# Patient Record
Sex: FEMALE | Race: WHITE | NOT HISPANIC OR LATINO | Employment: UNEMPLOYED | URBAN - NONMETROPOLITAN AREA
[De-identification: names, ages, dates, MRNs, and addresses within clinical notes are randomized per-mention and may not be internally consistent; named-entity substitution may affect disease eponyms.]

---

## 2017-05-01 ENCOUNTER — HOSPITAL ENCOUNTER (OUTPATIENT)
Dept: GENERAL RADIOLOGY | Facility: HOSPITAL | Age: 52
Discharge: HOME | End: 2017-05-01
Attending: INTERNAL MEDICINE | Admitting: INTERNAL MEDICINE

## 2017-05-01 DIAGNOSIS — R06.02 SOB (SHORTNESS OF BREATH): Primary | ICD-10-CM

## 2017-05-01 DIAGNOSIS — R06.02 SOB (SHORTNESS OF BREATH): ICD-10-CM

## 2017-05-01 PROCEDURE — 71020 XR CHEST PA AND LATERAL: CPT | Performed by: RADIOLOGY

## 2017-05-01 PROCEDURE — 71020 HC CHEST PA AND LATERAL: CPT

## 2017-05-02 ENCOUNTER — OFFICE VISIT (OUTPATIENT)
Dept: PULMONOLOGY | Facility: CLINIC | Age: 52
End: 2017-05-02

## 2017-05-02 VITALS
BODY MASS INDEX: 28.04 KG/M2 | WEIGHT: 185 LBS | HEART RATE: 68 BPM | TEMPERATURE: 97.8 F | OXYGEN SATURATION: 98 % | HEIGHT: 68 IN | SYSTOLIC BLOOD PRESSURE: 142 MMHG | DIASTOLIC BLOOD PRESSURE: 92 MMHG

## 2017-05-02 DIAGNOSIS — J45.20 MILD INTERMITTENT ASTHMA WITHOUT COMPLICATION: Primary | ICD-10-CM

## 2017-05-02 LAB
FEV1: 1.8 LITERS
FVC VOL RESPIRATORY: 2.04 LITERS

## 2017-05-02 PROCEDURE — 94010 BREATHING CAPACITY TEST: CPT | Performed by: INTERNAL MEDICINE

## 2017-05-02 PROCEDURE — 99213 OFFICE O/P EST LOW 20 MIN: CPT | Performed by: INTERNAL MEDICINE

## 2017-05-02 ASSESSMENT — PULMONARY FUNCTION TESTS
FEV1: 1.80
FVC: 2.04

## 2018-01-23 ENCOUNTER — OFFICE VISIT (OUTPATIENT)
Dept: PULMONOLOGY | Facility: CLINIC | Age: 53
End: 2018-01-23

## 2018-01-23 VITALS
HEART RATE: 65 BPM | HEIGHT: 68 IN | WEIGHT: 189.2 LBS | SYSTOLIC BLOOD PRESSURE: 142 MMHG | TEMPERATURE: 97.6 F | BODY MASS INDEX: 28.67 KG/M2 | OXYGEN SATURATION: 98 % | DIASTOLIC BLOOD PRESSURE: 87 MMHG

## 2018-01-23 DIAGNOSIS — J45.20 MILD INTERMITTENT ASTHMA WITHOUT COMPLICATION: Primary | ICD-10-CM

## 2018-01-23 DIAGNOSIS — Z23 IMMUNIZATION DUE: ICD-10-CM

## 2018-01-23 LAB
FEV1: 1.88 LITERS
FVC VOL RESPIRATORY: 2.06 LITERS

## 2018-01-23 PROCEDURE — 90471 IMMUNIZATION ADMIN: CPT | Performed by: INTERNAL MEDICINE

## 2018-01-23 PROCEDURE — 90686 IIV4 VACC NO PRSV 0.5 ML IM: CPT | Performed by: INTERNAL MEDICINE

## 2018-01-23 PROCEDURE — 99213 OFFICE O/P EST LOW 20 MIN: CPT | Performed by: INTERNAL MEDICINE

## 2018-01-23 PROCEDURE — 94010 BREATHING CAPACITY TEST: CPT | Performed by: INTERNAL MEDICINE

## 2018-01-23 ASSESSMENT — PULMONARY FUNCTION TESTS
FEV1: 1.88
FVC: 2.06

## 2018-04-10 ENCOUNTER — HOSPITAL ENCOUNTER (OUTPATIENT)
Dept: CARDIOLOGY | Facility: HOSPITAL | Age: 53
Discharge: HOME | End: 2018-04-10
Attending: NURSE PRACTITIONER | Admitting: NURSE PRACTITIONER

## 2018-04-10 ENCOUNTER — OFFICE VISIT (OUTPATIENT)
Dept: CARDIOLOGY | Facility: HOSPITAL | Age: 53
End: 2018-04-10

## 2018-04-10 VITALS
RESPIRATION RATE: 16 BRPM | HEART RATE: 90 BPM | WEIGHT: 191.2 LBS | TEMPERATURE: 97.6 F | SYSTOLIC BLOOD PRESSURE: 143 MMHG | HEIGHT: 68 IN | OXYGEN SATURATION: 100 % | DIASTOLIC BLOOD PRESSURE: 85 MMHG | BODY MASS INDEX: 28.98 KG/M2

## 2018-04-10 DIAGNOSIS — R06.00 DYSPNEA, UNSPECIFIED TYPE: ICD-10-CM

## 2018-04-10 DIAGNOSIS — I10 ESSENTIAL HYPERTENSION: Primary | ICD-10-CM

## 2018-04-10 DIAGNOSIS — R00.2 PALPITATIONS: ICD-10-CM

## 2018-04-10 PROCEDURE — 93005 ELECTROCARDIOGRAM TRACING: CPT | Performed by: NURSE PRACTITIONER

## 2018-04-10 PROCEDURE — 99204 OFFICE O/P NEW MOD 45 MIN: CPT | Performed by: NURSE PRACTITIONER

## 2018-04-10 PROCEDURE — 93010 ELECTROCARDIOGRAM REPORT: CPT | Performed by: INTERNAL MEDICINE

## 2018-04-10 PROCEDURE — 93270 REMOTE 30 DAY ECG REV/REPORT: CPT

## 2018-04-10 RX ORDER — HYDRALAZINE HYDROCHLORIDE 10 MG/1
10 TABLET, FILM COATED ORAL 3 TIMES DAILY
COMMUNITY
End: 2018-04-10

## 2018-04-10 RX ORDER — AMLODIPINE BESYLATE 5 MG/1
5 TABLET ORAL DAILY
COMMUNITY
End: 2018-05-22 | Stop reason: SINTOL

## 2018-04-10 RX ORDER — LOSARTAN POTASSIUM 50 MG/1
50 TABLET ORAL 2 TIMES DAILY
COMMUNITY
End: 2019-09-26

## 2018-04-10 RX ORDER — CLONIDINE HYDROCHLORIDE 0.1 MG/1
0.1 TABLET ORAL 2 TIMES DAILY PRN
COMMUNITY
End: 2019-09-26

## 2018-04-10 NOTE — PROGRESS NOTES
"Encounter Date:04/10/2018      Patient ID: Darlene Gaviria is a 53 y.o. female.        Subjective:     Chief Complaint: Establish Care (HTN)     History of Present Illness  Ms. Gaviria is a 54 YO F with history of asthma, mediastinal lymphoma, and HTN who [facility] of HTN. She reports HTN for over a year. She is currently taking metoprolol 50mg BID and losartan 50mg BID and clonidine as needed. She brings in BP/HR log and BPs run in the 130s-150s/80-100s. HRs are . She reports a history of palpitations and feelings of her heart \"pounding\" on a weekly basis with some improvement on metoprolol. No chest pain. She does have dyspnea during episodes of palpitations. She also has headache and muscle heaviness when BP uncontrolled. She was started on hydralazine and amlodipine by different providers but hasn't started taking yet. She reports clonidine causes her fatigue and she really does not want to take this. She had a normal echo in  and normal stress test in 2018 ordered by cardiologist Dr. Fox. She has never wore a cardiac monitor. She is concerned because she says she lives a healthy lifestyle and doesn't understand why she has to be on so many medications for her blood pressure    Patient Active Problem List   Diagnosis   • Mild intermittent asthma without complication   • Essential hypertension         Past Surgical History:   Procedure Laterality Date   • APPENDECTOMY     • BREAST BIOPSY     •  SECTION     • GALLBLADDER SURGERY     • HYSTERECTOMY     • OTHER SURGICAL HISTORY      Diaphragm and left vocal cord paralysed.   • THORACIC DISC SURGERY         No Known Allergies      Current Outpatient Prescriptions:   •  CloNIDine (CATAPRES) 0.1 MG tablet, Take 0.1 mg by mouth 2 (Two) Times a Day As Needed for High Blood Pressure (SBP > 160)., Disp: , Rfl:   •  losartan (COZAAR) 50 MG tablet, Take 50 mg by mouth 2 (Two) Times a Day., Disp: , " Rfl:   •  albuterol (PROVENTIL) (2.5 MG/3ML) 0.083% nebulizer solution, inhale contents of 1 vial in nebulizer every 4 to 6 hours as directed if needed, Disp: , Rfl: 0  •  amLODIPine (NORVASC) 5 MG tablet, Take 5 mg by mouth Daily., Disp: , Rfl:   •  metoprolol tartrate (LOPRESSOR) 50 MG tablet, Take 50 mg by mouth Every 12 (Twelve) Hours., Disp: , Rfl:     The following portions of the chart were reviewed and updated as appropriate: Allergies, current medications, past family history, social history, past medical history.     Review of Systems   Constitution: Positive for weakness and malaise/fatigue. Negative for chills, decreased appetite, diaphoresis, fever, night sweats, weight gain and weight loss.   HENT: Negative for congestion, hearing loss, hoarse voice and nosebleeds.    Eyes: Positive for blurred vision. Negative for visual disturbance and visual halos.   Cardiovascular: Positive for chest pain, claudication, dyspnea on exertion, irregular heartbeat, leg swelling and palpitations. Negative for cyanosis, near-syncope, orthopnea, paroxysmal nocturnal dyspnea and syncope.   Respiratory: Positive for shortness of breath. Negative for cough, hemoptysis, sleep disturbances due to breathing, snoring, sputum production and wheezing.    Hematologic/Lymphatic: Negative for bleeding problem. Does not bruise/bleed easily.   Skin: Negative for dry skin, itching and rash.   Musculoskeletal: Positive for muscle weakness. Negative for arthritis, joint pain, joint swelling and myalgias.   Gastrointestinal: Positive for nausea. Negative for bloating, abdominal pain, constipation, diarrhea, flatus, heartburn, hematemesis, hematochezia, melena and vomiting.   Genitourinary: Positive for frequency. Negative for dysuria, hematuria, nocturia and urgency.   Neurological: Positive for dizziness, headaches, light-headedness and loss of balance. Negative for excessive daytime sleepiness.   Psychiatric/Behavioral: Negative for  "depression. The patient is nervous/anxious. The patient does not have insomnia.            Objective:     Vitals:    04/10/18 1402 04/10/18 1404   BP: 146/82 143/85   BP Location: Right arm Right arm   Patient Position: Sitting Standing   Pulse: 85 90   Resp: 16    Temp: 97.6 °F (36.4 °C)    TempSrc: Temporal Artery     SpO2: 100%    Weight: 86.7 kg (191 lb 3.2 oz)    Height: 172.7 cm (68\")          Physical Exam   Constitutional: She is oriented to person, place, and time. She appears well-developed and well-nourished. No distress.   HENT:   Head: Normocephalic.   Eyes: Conjunctivae are normal. Pupils are equal, round, and reactive to light.   Neck: Neck supple. No JVD present. No thyromegaly present.   Cardiovascular: Normal rate, regular rhythm, normal heart sounds and intact distal pulses.  Exam reveals no gallop and no friction rub.    No murmur heard.  Pulmonary/Chest: Effort normal and breath sounds normal. No respiratory distress. She has no wheezes. She has no rales. She exhibits no tenderness.   Abdominal: Soft. Bowel sounds are normal.   Musculoskeletal: Normal range of motion. She exhibits no edema.   Neurological: She is alert and oriented to person, place, and time.   Skin: Skin is warm and dry.   Psychiatric: She has a normal mood and affect. Her behavior is normal. Thought content normal.   Vitals reviewed.      Lab and Diagnostic Review:    EKG today shows NSR, LVH with repolarization abnormality    Cardiolite stress test on February 7, 2018 showed moderate impairment of exercise capacity, EKG that showed no ischemia, no stress-induced arrhythmias, average heart rate response, hypertensive blood pressure response, normal LV systolic function and wall motion, no evidence of perfusion defect or stress induced LV cavity dilatation    Echo 8/10/2016 showed normal LV size and wall thickness normal LV systolic function and wall motion estimated EF 60-65% normal cardiac chamber dimensions with no " significant valvular abnormalities.    Labs 3/20/2018 show glucose 97, BUN 16, creatinine 0.9, sodium 142, potassium 4.1, chloride 103, carbon dioxide 24    Assessment and Plan:         1. Essential hypertension  - Start amlodipine 5mg in am (BPs higher in PM). Continue all other meds. Hopefully with this she will not need the clonidine  - HTN Education provided today including signs and symptoms, medication management, daily blood pressure monitoring. Recommended DASH diet. Patient encouraged to call the [facility] with any blood pressure consistently greater than 130/80  - Duplex Renal Artery - Bilateral Complete CAR; Future    2. Palpitations  - Continue metoprolol. She does not want to increase at this time due to side effects  - Cardiac Event Monitor; Future    3. Dyspnea, unspecified type  - Normal stress in 2018 and echo in 2016  - ECG 12 Lead; Future    RV in 6 weeks    It has been a pleasure to participate in the care of this patient.  Patient was instructed to call the [facility] with any questions, concerns, or worsening symptoms.      * Please note that portions of this note were completed with a voice recognition program. Efforts were made to edit the dictation but occasionally words are transcribed.

## 2018-04-11 ENCOUNTER — HOSPITAL ENCOUNTER (OUTPATIENT)
Dept: CARDIOLOGY | Facility: HOSPITAL | Age: 53
Discharge: HOME | End: 2018-04-11
Attending: NURSE PRACTITIONER

## 2018-04-11 DIAGNOSIS — R00.2 PALPITATIONS: ICD-10-CM

## 2018-04-11 PROCEDURE — 93270 REMOTE 30 DAY ECG REV/REPORT: CPT

## 2018-05-22 ENCOUNTER — OFFICE VISIT (OUTPATIENT)
Dept: CARDIOLOGY | Facility: HOSPITAL | Age: 53
End: 2018-05-22

## 2018-05-22 VITALS
HEIGHT: 68 IN | RESPIRATION RATE: 17 BRPM | OXYGEN SATURATION: 98 % | TEMPERATURE: 97.2 F | HEART RATE: 94 BPM | WEIGHT: 190 LBS | BODY MASS INDEX: 28.79 KG/M2 | DIASTOLIC BLOOD PRESSURE: 99 MMHG | SYSTOLIC BLOOD PRESSURE: 129 MMHG

## 2018-05-22 DIAGNOSIS — R00.2 PALPITATIONS: Primary | ICD-10-CM

## 2018-05-22 DIAGNOSIS — I10 ESSENTIAL HYPERTENSION: ICD-10-CM

## 2018-05-22 PROCEDURE — 93272 ECG/REVIEW INTERPRET ONLY: CPT | Performed by: INTERNAL MEDICINE

## 2018-05-22 PROCEDURE — 99213 OFFICE O/P EST LOW 20 MIN: CPT | Performed by: NURSE PRACTITIONER

## 2018-05-22 NOTE — PROGRESS NOTES
[facility]      Encounter Date:2018     Darlene Gaviria  [address]  [place] [address]  [phone]    1965    Dima Duarte MD    Darlene Gaviria is a 53 y.o. female.      Subjective:     Chief Complaint:  Follow up palpitations       HPI   [facility] to follow up on palpitations. Patient wore event monitor, which showed no significant arrhythmias. One triggered event was sinus tachycardia with a rate of 109. She only wore the event monitor for a couple of weeks because of skin sensitivity. Overall she is feeling much better. Palpitations have improved. She started on amlodipine and BP was well controlled without clonidine. However, after 2 weeks she had an episode of dizziness and says BP was 70/40. She stopped amlodipine and since then BP has been in the 130-140s systolic. Patient did not have renal duplex because it could only be done in [place]    Patient Active Problem List   Diagnosis   • Mild intermittent asthma without complication   • Essential hypertension       Past Medical History:   Diagnosis Date   • Hypertension    • Non Hodgkin's lymphoma     surgery to remove cancer, part of left lung @ [place], [place]       Past Surgical History:   Procedure Laterality Date   • APPENDECTOMY     • BREAST BIOPSY     •  SECTION     • GALLBLADDER SURGERY     • HYSTERECTOMY     • OTHER SURGICAL HISTORY      Diaphragm and left vocal cord paralysed.   • THORACIC DISC SURGERY         Family History   Problem Relation Age of Onset   • Hypertension Mother    • Hypothyroidism Mother    • Hypertension Father    • Diabetes Father    • Heart disease Father    • Hypothyroidism Father    • Heart disease Maternal Grandmother    • Hypertension Maternal Grandmother    • Heart disease Maternal Grandfather    • Diabetes Maternal Grandfather    • Hypertension Maternal Grandfather    • Heart disease Paternal Grandmother    •  Hypertension Paternal Grandmother    • Heart disease Paternal Grandfather    • Hypertension Paternal Grandfather        Social History     Social History   • Marital status:      Spouse name: N/A   • Number of children: N/A   • Years of education: N/A     Occupational History   • Disabled      Social History Main Topics   • Smoking status: Never Smoker   • Smokeless tobacco: Never Used   • Alcohol use No   • Drug use: No   • Sexual activity: Not on file     Other Topics Concern   • Not on file     Social History Narrative    Caffeine:3 serving per day.           No Known Allergies      Current Outpatient Prescriptions:   •  albuterol (PROVENTIL) (2.5 MG/3ML) 0.083% nebulizer solution, inhale contents of 1 vial in nebulizer every 4 to 6 hours as directed if needed, Disp: , Rfl: 0  •  CloNIDine (CATAPRES) 0.1 MG tablet, Take 0.1 mg by mouth 2 (Two) Times a Day As Needed for High Blood Pressure (SBP > 160)., Disp: , Rfl:   •  losartan (COZAAR) 50 MG tablet, Take 50 mg by mouth 2 (Two) Times a Day., Disp: , Rfl:   •  metoprolol tartrate (LOPRESSOR) 50 MG tablet, Take 50 mg by mouth Every 12 (Twelve) Hours., Disp: , Rfl:     The following portions of the patient's history were reviewed and updated as appropriate: allergies, current medications, past family history, past medical history, past social history, past surgical history and problem list.    Review of Systems   Constitution: Positive for weakness and malaise/fatigue. Negative for chills, decreased appetite, diaphoresis, fever, night sweats, weight gain and weight loss.   HENT: Negative for congestion, hearing loss, hoarse voice and nosebleeds.    Eyes: Positive for blurred vision. Negative for visual disturbance and visual halos.   Cardiovascular: Positive for dyspnea on exertion and palpitations. Negative for chest pain, claudication, cyanosis, irregular heartbeat, leg swelling, near-syncope, orthopnea, paroxysmal nocturnal dyspnea and syncope.  "  Respiratory: Positive for cough, sputum production and wheezing. Negative for hemoptysis, shortness of breath, sleep disturbances due to breathing and snoring.    Hematologic/Lymphatic: Negative for bleeding problem. Does not bruise/bleed easily.   Skin: Negative for dry skin, itching and rash.   Musculoskeletal: Negative for arthritis, joint pain, joint swelling, muscle weakness and myalgias.   Gastrointestinal: Negative for bloating, abdominal pain, constipation, diarrhea, flatus, heartburn, hematemesis, hematochezia, melena, nausea and vomiting.   Genitourinary: Negative for dysuria, frequency, hematuria, nocturia and urgency.   Neurological: Negative for excessive daytime sleepiness, dizziness, headaches, light-headedness and loss of balance.   Psychiatric/Behavioral: Negative for depression. The patient does not have insomnia and is not nervous/anxious.        Objective:     Vitals:    05/22/18 1529 05/22/18 1531   BP: 141/88 129/99   BP Location: Right arm Right arm   Patient Position: Sitting Standing   Cuff Size: Adult    Pulse: 74 94   Resp: 17    Temp: 97.2 °F (36.2 °C)    TempSrc: Temporal Artery     SpO2: 98%    Weight: 86.2 kg (190 lb)    Height: 172.7 cm (68\")        Physical Exam   Constitutional: She is oriented to person, place, and time. She appears well-developed and well-nourished. No distress.   HENT:   Head: Normocephalic.   Eyes: Conjunctivae are normal. Pupils are equal, round, and reactive to light.   Neck: Neck supple. No JVD present. No thyromegaly present.   Cardiovascular: Normal rate, regular rhythm, normal heart sounds and intact distal pulses.  Exam reveals no gallop and no friction rub.    No murmur heard.  Pulmonary/Chest: Effort normal and breath sounds normal. No respiratory distress. She has no wheezes. She has no rales. She exhibits no tenderness.   Abdominal: Soft. Bowel sounds are normal.   Musculoskeletal: Normal range of motion. She exhibits no edema.   Neurological: She " is alert and oriented to person, place, and time.   Skin: Skin is warm and dry.   Psychiatric: She has a normal mood and affect. Her behavior is normal. Thought content normal.   Vitals reviewed.      Lab and Diagnostic Review:  Reviewed results of event monitor with patient.  Monitor time was 9 days in 2 hours.  Underlying rhythm was normal sinus rhythm with a average heart rate of 83, minimum heart rate 55, maximum heart rate of 149.  Triggered event was sinus tachycardia with a heart rate of 109.  There was infrequent ectopy      Assessment and Plan:   1. Palpitations  - Stable. Symptoms improved. No significant arrhythmias on monitor    2. Essential hypertension  - Controlled.  - HTN Education provided today including signs and symptoms, medication management, daily blood pressure monitoring. Recommended DASH diet. Patient encouraged to call the [facility] with any blood pressure consistently greater than 130/80. I advised her to resume amlodipine at 2.5mg daily if SBP consistently >130. No need for renal duplex at this time    FU PRN        It has been a pleasure to participate in the care of this patient.  Patient was instructed to call the [facility] with any questions, concerns, or worsening symptoms.    *Please note that portions of this note were completed with a voice recognition program. Efforts were made to edit the dictations, but occasionally words are mistranscribed.

## 2018-07-23 DIAGNOSIS — R06.02 SOB (SHORTNESS OF BREATH): Primary | ICD-10-CM

## 2018-07-24 ENCOUNTER — OFFICE VISIT (OUTPATIENT)
Dept: PULMONOLOGY | Facility: CLINIC | Age: 53
End: 2018-07-24

## 2018-07-24 VITALS
TEMPERATURE: 97.7 F | WEIGHT: 189.4 LBS | HEART RATE: 67 BPM | SYSTOLIC BLOOD PRESSURE: 146 MMHG | BODY MASS INDEX: 28.7 KG/M2 | OXYGEN SATURATION: 97 % | HEIGHT: 68 IN | DIASTOLIC BLOOD PRESSURE: 102 MMHG

## 2018-07-24 DIAGNOSIS — J45.20 MILD INTERMITTENT ASTHMA WITHOUT COMPLICATION: Primary | ICD-10-CM

## 2018-07-24 DIAGNOSIS — C85.92 NON-HODGKIN LYMPHOMA OF INTRATHORACIC LYMPH NODES, UNSPECIFIED NON-HODGKIN LYMPHOMA TYPE (HCC): ICD-10-CM

## 2018-07-24 LAB
FEV1: 1.76 LITERS
FVC VOL RESPIRATORY: 1.95 LITERS

## 2018-07-24 PROCEDURE — 94010 BREATHING CAPACITY TEST: CPT | Performed by: INTERNAL MEDICINE

## 2018-07-24 PROCEDURE — 99213 OFFICE O/P EST LOW 20 MIN: CPT | Performed by: INTERNAL MEDICINE

## 2018-07-24 ASSESSMENT — PULMONARY FUNCTION TESTS
FVC: 1.95
FEV1: 1.76

## 2018-07-24 NOTE — PROGRESS NOTES
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 Pulido of oncology      Review of Systems intermittent cough occasional shortness of breath and wheezing over seems to be under control with singular once a day Advair twice a day and rarely needs to use the Inhaler for rescue and takes losartan and metoprolol for blood pressure    Active Problems:  Problem List Items Addressed This Visit        Respiratory    Mild intermittent asthma without complication - Primary    Relevant Orders    Pulmonary Function Test (Completed)      Other Visit Diagnoses     Non-Hodgkin lymphoma of intrathoracic lymph nodes, unspecified non-Hodgkin lymphoma type (CMS/HCC)              Past Medical History:  Past Medical History:   Diagnosis Date   • Hypertension    • Non Hodgkin's lymphoma (CMS/HCC) 2008    surgery to remove cancer, part of left lung @ [place], [place]       Family History:  Family History   Problem Relation Age of Onset   • Hypertension Mother    • Hypothyroidism Mother    • Hypertension Father    • Diabetes Father    • Heart disease Father    • Hypothyroidism Father    • Heart disease Maternal Grandmother    • Hypertension Maternal Grandmother    • Heart disease Maternal Grandfather    • Diabetes Maternal Grandfather    • Hypertension Maternal Grandfather    • Heart disease Paternal Grandmother    • Hypertension Paternal Grandmother    • Heart disease Paternal Grandfather    • Hypertension Paternal Grandfather        Social History:  Social History   Substance Use Topics   • Smoking status: Never Smoker   • Smokeless tobacco: Never Used   • Alcohol use No       Current  "Medications:  Current Outpatient Prescriptions   Medication Sig Dispense Refill   • albuterol (PROVENTIL) (2.5 MG/3ML) 0.083% nebulizer solution inhale contents of 1 vial in nebulizer every 4 to 6 hours as directed if needed  0   • CloNIDine (CATAPRES) 0.1 MG tablet Take 0.1 mg by mouth 2 (Two) Times a Day As Needed for High Blood Pressure (SBP > 160).     • losartan (COZAAR) 50 MG tablet Take 50 mg by mouth 2 (Two) Times a Day.     • metoprolol tartrate (LOPRESSOR) 50 MG tablet Take 50 mg by mouth Every 12 (Twelve) Hours.       No current facility-administered medications for this visit.        Allergies:  No Known Allergies    Vitals:  BP (!) 146/102   Pulse 67   Temp 97.7 °F (36.5 °C) (Oral)   Ht 172.7 cm (68\")   Wt 85.9 kg (189 lb 6.4 oz)   SpO2 97%   BMI 28.80 kg/m²     Physical Exam no acute distress vital signs stable O2 sat 97% on room air blood pressure 146/102 scar of old mediastinotomy minimal expiratory Wheezes heart regular no clubbing cyanosis or cervical lymphadenopathy    Imaging: Last chest x-ray was May 2017 that showed Elevated right hemidiaphragm with no indication of mediastinal Fullness    PFT: FVC 48 FEV1 55% indicating Restrictive impairment due to mediastinal Exploration and to previous lymphoma  Results for orders placed or performed in visit on 07/24/18   Pulmonary Function Test   Result Value Ref Range    FEV1 1.76 liters    FVC 1.95 liters           ASSESSMENT AND DIAGNOSIS:1-asthma seemingly mild and controlled-2-history of non-Hodgkin lymphoma intrathoracic That has been treated with 6 cycles of chemotherapy 2008 and being Followed by Oncology3-hypertension not well-controlled being followed by Dr. Duarte primary care physician  Recommendation continue Advair singular and albuterol inhaler  Reminded her of getting Annual flu vaccine and pneumonia vaccine per protocol    Follow-Up:Return in 6 month with a chest x-ray and PFT or earlier as needed                    "

## 2019-03-04 DIAGNOSIS — R06.02 SOB (SHORTNESS OF BREATH): Primary | ICD-10-CM

## 2019-03-26 DIAGNOSIS — R06.02 SOB (SHORTNESS OF BREATH): Primary | ICD-10-CM

## 2019-08-20 ENCOUNTER — HOSPITAL ENCOUNTER (OUTPATIENT)
Dept: GENERAL RADIOLOGY | Facility: HOSPITAL | Age: 54
Discharge: HOME | End: 2019-08-20
Admitting: INTERNAL MEDICINE

## 2019-08-20 PROCEDURE — 71046 X-RAY EXAM CHEST 2 VIEWS: CPT | Performed by: RADIOLOGY

## 2019-08-20 PROCEDURE — 71046 X-RAY EXAM CHEST 2 VIEWS: CPT

## 2019-08-23 ENCOUNTER — OFFICE VISIT (OUTPATIENT)
Dept: PULMONOLOGY | Facility: CLINIC | Age: 54
End: 2019-08-23

## 2019-08-23 VITALS
SYSTOLIC BLOOD PRESSURE: 153 MMHG | BODY MASS INDEX: 30.16 KG/M2 | HEIGHT: 68 IN | HEART RATE: 75 BPM | WEIGHT: 199 LBS | DIASTOLIC BLOOD PRESSURE: 104 MMHG | OXYGEN SATURATION: 95 % | TEMPERATURE: 98 F

## 2019-08-23 DIAGNOSIS — C85.92 NON-HODGKIN LYMPHOMA OF INTRATHORACIC LYMPH NODES, UNSPECIFIED NON-HODGKIN LYMPHOMA TYPE (HCC): ICD-10-CM

## 2019-08-23 DIAGNOSIS — J45.20 MILD INTERMITTENT ASTHMA WITHOUT COMPLICATION: Primary | ICD-10-CM

## 2019-08-23 PROCEDURE — 99212 OFFICE O/P EST SF 10 MIN: CPT | Performed by: INTERNAL MEDICINE

## 2019-08-23 NOTE — PROGRESS NOTES
Subjective   Chief Complaint   Patient presents with   • Asthma       Darlene Gaviria is a 54 y.o. female     History of Present Illness 54-year-old female returning for follow-up of asthma that is mild and controlled using Symbicort 1 puff a day and rarely needs to use the Ventolin rescue inhaler past history is significant for non-Hodgkin's lymphoma of the mediastinum that was discovered with mediastinoscopy May 2008 treated by oncology is currently in remission and discharge from oncology follow-up    Review of Systems minimal shortness of breath and rare wheezing can otherwise review of systems noncontributory    Family History   Problem Relation Age of Onset   • Hypertension Mother    • Hypothyroidism Mother    • Hypertension Father    • Diabetes Father    • Heart disease Father    • Hypothyroidism Father    • Heart disease Maternal Grandmother    • Hypertension Maternal Grandmother    • Heart disease Maternal Grandfather    • Diabetes Maternal Grandfather    • Hypertension Maternal Grandfather    • Heart disease Paternal Grandmother    • Hypertension Paternal Grandmother    • Heart disease Paternal Grandfather    • Hypertension Paternal Grandfather        Past Medical History:   Diagnosis Date   • Hypertension    • Non Hodgkin's lymphoma (CMS/HCC)     surgery to remove cancer, part of left lung @ [place], [place]       Past Surgical History:   Procedure Laterality Date   • APPENDECTOMY     • BREAST BIOPSY     •  SECTION     • GALLBLADDER SURGERY     • HYSTERECTOMY     • OTHER SURGICAL HISTORY      Diaphragm and left vocal cord paralysed.   • THORACIC DISC SURGERY         Social History     Socioeconomic History   • Marital status:      Spouse name: Not on file   • Number of children: Not on file   • Years of education: Not on file   • Highest education level: Not on file   Occupational History   • Occupation: Disabled   Tobacco Use   • Smoking status: Never Smoker   • Smokeless tobacco: Never  Used   Substance and Sexual Activity   • Alcohol use: No   • Drug use: No   Social History Narrative    Caffeine:3 serving per day.        Physical Exam: No acute distress no cervical lymphadenopathy scar of median sternotomy that is well-healed lungs clear heart regular    PFT: No PFT today usually is borderline normal    Imaging: Chest x-ray August 28 shows score of old man has an ostomy and elevated left hemidiaphragm that is unchanged since July 2008    Other Labs:       ASSESSMENT1-mild and stable-2-non-Hodgkin lymphoma mediastinum treated and currently in remission        Recommendations: Continue Symbicort and Ventolin inhaler reminded her of getting annual flu vaccine    Follow up: Return February 19 with a chest x-ray and PFT and that may be my final visit as I and my practice in my practice in [place] by the end of February

## 2019-09-10 PROBLEM — J38.00 PARALYSIS OF LARYNX: Status: ACTIVE | Noted: 2019-09-10

## 2019-09-10 PROBLEM — R13.14 DYSPHAGIA, PHARYNGOESOPHAGEAL PHASE: Status: ACTIVE | Noted: 2019-09-10

## 2019-09-10 PROBLEM — J37.0 CHRONIC LARYNGITIS: Status: ACTIVE | Noted: 2019-09-10

## 2019-09-24 ENCOUNTER — APPOINTMENT (OUTPATIENT)
Dept: PREADMISSION TESTING | Facility: HOSPITAL | Age: 54
End: 2019-09-24

## 2019-09-26 ENCOUNTER — APPOINTMENT (OUTPATIENT)
Dept: PREADMISSION TESTING | Facility: HOSPITAL | Age: 54
End: 2019-09-26

## 2019-09-26 LAB
ANION GAP SERPL CALCULATED.3IONS-SCNC: 10.2 MMOL/L (ref 5–15)
BUN BLD-MCNC: 11 MG/DL (ref 6–20)
BUN/CREAT SERPL: 13.6 (ref 7–25)
CALCIUM SPEC-SCNC: 9.8 MG/DL (ref 8.6–10.5)
CHLORIDE SERPL-SCNC: 104 MMOL/L (ref 98–107)
CO2 SERPL-SCNC: 27.8 MMOL/L (ref 22–29)
CREAT BLD-MCNC: 0.81 MG/DL (ref 0.57–1)
DEPRECATED RDW RBC AUTO: 43.5 FL (ref 37–54)
ERYTHROCYTE [DISTWIDTH] IN BLOOD BY AUTOMATED COUNT: 13.3 % (ref 12.3–15.4)
GFR SERPL CREATININE-BSD FRML MDRD: 74 ML/MIN/1.73
GLUCOSE BLD-MCNC: 81 MG/DL (ref 65–99)
HCT VFR BLD AUTO: 41.7 % (ref 34–46.6)
HGB BLD-MCNC: 13.5 G/DL (ref 12–15.9)
MCH RBC QN AUTO: 29.5 PG (ref 26.6–33)
MCHC RBC AUTO-ENTMCNC: 32.4 G/DL (ref 31.5–35.7)
MCV RBC AUTO: 91 FL (ref 79–97)
PLATELET # BLD AUTO: 206 10*3/MM3 (ref 140–450)
PMV BLD AUTO: 11.4 FL (ref 6–12)
POTASSIUM BLD-SCNC: 3.9 MMOL/L (ref 3.5–5.2)
RBC # BLD AUTO: 4.58 10*6/MM3 (ref 3.77–5.28)
SODIUM BLD-SCNC: 142 MMOL/L (ref 136–145)
WBC NRBC COR # BLD: 5.34 10*3/MM3 (ref 3.4–10.8)

## 2019-09-26 PROCEDURE — 80048 BASIC METABOLIC PNL TOTAL CA: CPT | Performed by: OTOLARYNGOLOGY

## 2019-09-26 PROCEDURE — 93005 ELECTROCARDIOGRAM TRACING: CPT

## 2019-09-26 PROCEDURE — 93010 ELECTROCARDIOGRAM REPORT: CPT | Performed by: INTERNAL MEDICINE

## 2019-09-26 PROCEDURE — 85027 COMPLETE CBC AUTOMATED: CPT | Performed by: OTOLARYNGOLOGY

## 2019-09-26 PROCEDURE — 36415 COLL VENOUS BLD VENIPUNCTURE: CPT

## 2019-09-26 RX ORDER — VALSARTAN 80 MG/1
80 TABLET ORAL NIGHTLY
COMMUNITY
End: 2019-10-03 | Stop reason: HOSPADM

## 2019-09-26 RX ORDER — AMLODIPINE BESYLATE 5 MG/1
5 TABLET ORAL DAILY
COMMUNITY

## 2019-09-26 RX ORDER — CARVEDILOL 6.25 MG/1
6.25 TABLET ORAL 2 TIMES DAILY WITH MEALS
COMMUNITY

## 2019-09-26 RX ORDER — LORATADINE 10 MG/1
10 TABLET ORAL DAILY
COMMUNITY

## 2019-09-26 RX ORDER — RANITIDINE 300 MG/1
300 TABLET ORAL NIGHTLY
COMMUNITY
End: 2022-03-17

## 2019-09-26 RX ORDER — MONTELUKAST SODIUM 10 MG/1
10 TABLET ORAL NIGHTLY
COMMUNITY
End: 2021-02-01

## 2019-10-01 ENCOUNTER — ANESTHESIA EVENT (OUTPATIENT)
Dept: PERIOP | Facility: HOSPITAL | Age: 54
End: 2019-10-01

## 2019-10-02 ENCOUNTER — ANESTHESIA (OUTPATIENT)
Dept: PERIOP | Facility: HOSPITAL | Age: 54
End: 2019-10-02

## 2019-10-02 ENCOUNTER — HOSPITAL ENCOUNTER (INPATIENT)
Facility: HOSPITAL | Age: 54
LOS: 1 days | Discharge: HOME | End: 2019-10-03
Attending: OTOLARYNGOLOGY | Admitting: OTOLARYNGOLOGY

## 2019-10-02 PROBLEM — Z98.890 STATUS POST SURGERY: Status: ACTIVE | Noted: 2019-10-02

## 2019-10-02 PROCEDURE — 25010000002 MORPHINE PER 10 MG: Performed by: OTOLARYNGOLOGY

## 2019-10-02 PROCEDURE — 25010000002 HYDROMORPHONE 1 MG/ML SOLUTION: Performed by: ANESTHESIOLOGY

## 2019-10-02 PROCEDURE — 94799 UNLISTED PULMONARY SVC/PX: CPT

## 2019-10-02 PROCEDURE — 25010000002 FENTANYL CITRATE (PF) 100 MCG/2ML SOLUTION: Performed by: NURSE ANESTHETIST, CERTIFIED REGISTERED

## 2019-10-02 PROCEDURE — 25010000002 PROPOFOL 10 MG/ML EMULSION: Performed by: NURSE ANESTHETIST, CERTIFIED REGISTERED

## 2019-10-02 PROCEDURE — 25010000002 EPINEPHRINE PER 0.1 MG: Performed by: OTOLARYNGOLOGY

## 2019-10-02 PROCEDURE — 99223 1ST HOSP IP/OBS HIGH 75: CPT | Performed by: PHYSICIAN ASSISTANT

## 2019-10-02 PROCEDURE — 0CUV0JZ SUPPLEMENT LEFT VOCAL CORD WITH SYNTHETIC SUBSTITUTE, OPEN APPROACH: ICD-10-PCS | Performed by: OTOLARYNGOLOGY

## 2019-10-02 PROCEDURE — 25010000002 MIDAZOLAM PER 1 MG: Performed by: NURSE ANESTHETIST, CERTIFIED REGISTERED

## 2019-10-02 PROCEDURE — 25010000003 CEFAZOLIN SODIUM-DEXTROSE 2-3 GM-%(50ML) RECONSTITUTED SOLUTION: Performed by: NURSE ANESTHETIST, CERTIFIED REGISTERED

## 2019-10-02 PROCEDURE — 0BJ08ZZ INSPECTION OF TRACHEOBRONCHIAL TREE, VIA NATURAL OR ARTIFICIAL OPENING ENDOSCOPIC: ICD-10-PCS | Performed by: OTOLARYNGOLOGY

## 2019-10-02 PROCEDURE — 25010000002 DEXAMETHASONE PER 1 MG: Performed by: NURSE ANESTHETIST, CERTIFIED REGISTERED

## 2019-10-02 DEVICE — SIZE: FEMALE 11
Type: IMPLANTABLE DEVICE | Site: THYROID | Status: FUNCTIONAL
Brand: MONTGOMERY® THYROPLASTY IMPLANT

## 2019-10-02 RX ORDER — ONDANSETRON 2 MG/ML
4 INJECTION INTRAMUSCULAR; INTRAVENOUS AS NEEDED
Status: DISCONTINUED | OUTPATIENT
Start: 2019-10-02 | End: 2019-10-02 | Stop reason: HOSPADM

## 2019-10-02 RX ORDER — PROPOFOL 10 MG/ML
VIAL (ML) INTRAVENOUS AS NEEDED
Status: DISCONTINUED | OUTPATIENT
Start: 2019-10-02 | End: 2019-10-02 | Stop reason: SURG

## 2019-10-02 RX ORDER — SODIUM CHLORIDE 0.9 % (FLUSH) 0.9 %
3-10 SYRINGE (ML) INJECTION AS NEEDED
Status: DISCONTINUED | OUTPATIENT
Start: 2019-10-02 | End: 2019-10-02 | Stop reason: HOSPADM

## 2019-10-02 RX ORDER — OXYCODONE HYDROCHLORIDE AND ACETAMINOPHEN 5; 325 MG/1; MG/1
1 TABLET ORAL ONCE AS NEEDED
Status: DISCONTINUED | OUTPATIENT
Start: 2019-10-02 | End: 2019-10-02 | Stop reason: HOSPADM

## 2019-10-02 RX ORDER — MIDAZOLAM HYDROCHLORIDE 1 MG/ML
INJECTION INTRAMUSCULAR; INTRAVENOUS AS NEEDED
Status: DISCONTINUED | OUTPATIENT
Start: 2019-10-02 | End: 2019-10-02 | Stop reason: SURG

## 2019-10-02 RX ORDER — MONTELUKAST SODIUM 10 MG/1
10 TABLET ORAL NIGHTLY
Status: CANCELLED | OUTPATIENT
Start: 2019-10-02

## 2019-10-02 RX ORDER — NALOXONE HCL 0.4 MG/ML
0.4 VIAL (ML) INJECTION
Status: DISCONTINUED | OUTPATIENT
Start: 2019-10-02 | End: 2019-10-03 | Stop reason: HOSPADM

## 2019-10-02 RX ORDER — EPINEPHRINE 1 MG/ML
INJECTION, SOLUTION, CONCENTRATE INTRAVENOUS AS NEEDED
Status: DISCONTINUED | OUTPATIENT
Start: 2019-10-02 | End: 2019-10-02 | Stop reason: HOSPADM

## 2019-10-02 RX ORDER — DEXAMETHASONE SODIUM PHOSPHATE 4 MG/ML
INJECTION, SOLUTION INTRA-ARTICULAR; INTRALESIONAL; INTRAMUSCULAR; INTRAVENOUS; SOFT TISSUE AS NEEDED
Status: DISCONTINUED | OUTPATIENT
Start: 2019-10-02 | End: 2019-10-02 | Stop reason: SURG

## 2019-10-02 RX ORDER — CEFAZOLIN SODIUM 2 G/50ML
SOLUTION INTRAVENOUS AS NEEDED
Status: DISCONTINUED | OUTPATIENT
Start: 2019-10-02 | End: 2019-10-02 | Stop reason: SURG

## 2019-10-02 RX ORDER — SODIUM CHLORIDE, SODIUM LACTATE, POTASSIUM CHLORIDE, CALCIUM CHLORIDE 600; 310; 30; 20 MG/100ML; MG/100ML; MG/100ML; MG/100ML
100 INJECTION, SOLUTION INTRAVENOUS CONTINUOUS
Status: DISCONTINUED | OUTPATIENT
Start: 2019-10-02 | End: 2019-10-03 | Stop reason: HOSPADM

## 2019-10-02 RX ORDER — VALSARTAN 80 MG/1
80 TABLET ORAL NIGHTLY
Status: CANCELLED | OUTPATIENT
Start: 2019-10-02

## 2019-10-02 RX ORDER — FENTANYL CITRATE 50 UG/ML
50 INJECTION, SOLUTION INTRAMUSCULAR; INTRAVENOUS
Status: DISCONTINUED | OUTPATIENT
Start: 2019-10-02 | End: 2019-10-02 | Stop reason: HOSPADM

## 2019-10-02 RX ORDER — OXYMETAZOLINE HYDROCHLORIDE 0.05 G/100ML
SPRAY NASAL AS NEEDED
Status: DISCONTINUED | OUTPATIENT
Start: 2019-10-02 | End: 2019-10-02 | Stop reason: HOSPADM

## 2019-10-02 RX ORDER — FAMOTIDINE 20 MG/1
40 TABLET, FILM COATED ORAL DAILY
Status: DISCONTINUED | OUTPATIENT
Start: 2019-10-03 | End: 2019-10-03 | Stop reason: HOSPADM

## 2019-10-02 RX ORDER — CARVEDILOL 6.25 MG/1
6.25 TABLET ORAL 2 TIMES DAILY WITH MEALS
Status: DISCONTINUED | OUTPATIENT
Start: 2019-10-02 | End: 2019-10-03 | Stop reason: HOSPADM

## 2019-10-02 RX ORDER — SODIUM CHLORIDE 0.9 % (FLUSH) 0.9 %
10 SYRINGE (ML) INJECTION AS NEEDED
Status: DISCONTINUED | OUTPATIENT
Start: 2019-10-02 | End: 2019-10-03 | Stop reason: HOSPADM

## 2019-10-02 RX ORDER — ONDANSETRON 4 MG/1
4 TABLET, FILM COATED ORAL EVERY 6 HOURS PRN
Status: DISCONTINUED | OUTPATIENT
Start: 2019-10-02 | End: 2019-10-03 | Stop reason: HOSPADM

## 2019-10-02 RX ORDER — FAMOTIDINE 10 MG/ML
INJECTION, SOLUTION INTRAVENOUS AS NEEDED
Status: DISCONTINUED | OUTPATIENT
Start: 2019-10-02 | End: 2019-10-02 | Stop reason: SURG

## 2019-10-02 RX ORDER — MONTELUKAST SODIUM 10 MG/1
10 TABLET ORAL NIGHTLY
Status: DISCONTINUED | OUTPATIENT
Start: 2019-10-02 | End: 2019-10-03 | Stop reason: HOSPADM

## 2019-10-02 RX ORDER — MAGNESIUM HYDROXIDE 1200 MG/15ML
LIQUID ORAL AS NEEDED
Status: DISCONTINUED | OUTPATIENT
Start: 2019-10-02 | End: 2019-10-02 | Stop reason: HOSPADM

## 2019-10-02 RX ORDER — IPRATROPIUM BROMIDE AND ALBUTEROL SULFATE 2.5; .5 MG/3ML; MG/3ML
3 SOLUTION RESPIRATORY (INHALATION) ONCE AS NEEDED
Status: DISCONTINUED | OUTPATIENT
Start: 2019-10-02 | End: 2019-10-02 | Stop reason: HOSPADM

## 2019-10-02 RX ORDER — SODIUM CHLORIDE 0.9 % (FLUSH) 0.9 %
10 SYRINGE (ML) INJECTION EVERY 12 HOURS SCHEDULED
Status: DISCONTINUED | OUTPATIENT
Start: 2019-10-02 | End: 2019-10-03 | Stop reason: HOSPADM

## 2019-10-02 RX ORDER — MORPHINE SULFATE 2 MG/ML
2 INJECTION, SOLUTION INTRAMUSCULAR; INTRAVENOUS
Status: DISCONTINUED | OUTPATIENT
Start: 2019-10-02 | End: 2019-10-03 | Stop reason: HOSPADM

## 2019-10-02 RX ORDER — ALBUTEROL SULFATE 2.5 MG/3ML
2.5 SOLUTION RESPIRATORY (INHALATION) EVERY 4 HOURS PRN
Status: DISCONTINUED | OUTPATIENT
Start: 2019-10-02 | End: 2019-10-03 | Stop reason: HOSPADM

## 2019-10-02 RX ORDER — HYDROCODONE BITARTRATE AND ACETAMINOPHEN 5; 325 MG/1; MG/1
1 TABLET ORAL EVERY 4 HOURS PRN
Status: DISCONTINUED | OUTPATIENT
Start: 2019-10-02 | End: 2019-10-03 | Stop reason: HOSPADM

## 2019-10-02 RX ORDER — LIDOCAINE HYDROCHLORIDE AND EPINEPHRINE 10; 10 MG/ML; UG/ML
INJECTION, SOLUTION INFILTRATION; PERINEURAL AS NEEDED
Status: DISCONTINUED | OUTPATIENT
Start: 2019-10-02 | End: 2019-10-02 | Stop reason: HOSPADM

## 2019-10-02 RX ORDER — AMLODIPINE BESYLATE 5 MG/1
5 TABLET ORAL DAILY
Status: DISCONTINUED | OUTPATIENT
Start: 2019-10-03 | End: 2019-10-03 | Stop reason: HOSPADM

## 2019-10-02 RX ORDER — CETIRIZINE HYDROCHLORIDE 10 MG/1
10 TABLET ORAL DAILY
Status: DISCONTINUED | OUTPATIENT
Start: 2019-10-03 | End: 2019-10-03 | Stop reason: HOSPADM

## 2019-10-02 RX ORDER — SODIUM CHLORIDE 0.9 % (FLUSH) 0.9 %
3 SYRINGE (ML) INJECTION EVERY 12 HOURS SCHEDULED
Status: DISCONTINUED | OUTPATIENT
Start: 2019-10-02 | End: 2019-10-02 | Stop reason: HOSPADM

## 2019-10-02 RX ORDER — FENTANYL CITRATE 50 UG/ML
INJECTION, SOLUTION INTRAMUSCULAR; INTRAVENOUS AS NEEDED
Status: DISCONTINUED | OUTPATIENT
Start: 2019-10-02 | End: 2019-10-02 | Stop reason: SURG

## 2019-10-02 RX ORDER — MEPERIDINE HYDROCHLORIDE 25 MG/ML
12.5 INJECTION INTRAMUSCULAR; INTRAVENOUS; SUBCUTANEOUS
Status: DISCONTINUED | OUTPATIENT
Start: 2019-10-02 | End: 2019-10-02 | Stop reason: HOSPADM

## 2019-10-02 RX ADMIN — MIDAZOLAM HYDROCHLORIDE 2 MG: 1 INJECTION, SOLUTION INTRAMUSCULAR; INTRAVENOUS at 15:04

## 2019-10-02 RX ADMIN — CEFAZOLIN 1 G: 1 INJECTION, POWDER, FOR SOLUTION INTRAMUSCULAR; INTRAVENOUS; PARENTERAL at 23:25

## 2019-10-02 RX ADMIN — FENTANYL CITRATE 50 MCG: 50 INJECTION INTRAMUSCULAR; INTRAVENOUS at 13:38

## 2019-10-02 RX ADMIN — SODIUM CHLORIDE, POTASSIUM CHLORIDE, SODIUM LACTATE AND CALCIUM CHLORIDE 125 ML/HR: 600; 310; 30; 20 INJECTION, SOLUTION INTRAVENOUS at 10:54

## 2019-10-02 RX ADMIN — CEFAZOLIN SODIUM 2 G: 2 SOLUTION INTRAVENOUS at 13:35

## 2019-10-02 RX ADMIN — MORPHINE SULFATE 2 MG: 2 INJECTION, SOLUTION INTRAMUSCULAR; INTRAVENOUS at 23:31

## 2019-10-02 RX ADMIN — MIDAZOLAM HYDROCHLORIDE 2 MG: 1 INJECTION, SOLUTION INTRAMUSCULAR; INTRAVENOUS at 13:34

## 2019-10-02 RX ADMIN — DEXAMETHASONE SODIUM PHOSPHATE 20 MG: 4 INJECTION, SOLUTION INTRAMUSCULAR; INTRAVENOUS at 14:38

## 2019-10-02 RX ADMIN — DEXMEDETOMIDINE HYDROCHLORIDE 0.2 MCG/KG/HR: 100 INJECTION, SOLUTION INTRAVENOUS at 13:38

## 2019-10-02 RX ADMIN — PROPOFOL 30 MG: 10 INJECTION, EMULSION INTRAVENOUS at 13:38

## 2019-10-02 RX ADMIN — PROPOFOL 20 MG: 10 INJECTION, EMULSION INTRAVENOUS at 14:50

## 2019-10-02 RX ADMIN — FENTANYL CITRATE 50 MCG: 50 INJECTION INTRAMUSCULAR; INTRAVENOUS at 13:45

## 2019-10-02 RX ADMIN — FENTANYL CITRATE 50 MCG: 50 INJECTION INTRAMUSCULAR; INTRAVENOUS at 15:58

## 2019-10-02 RX ADMIN — MIDAZOLAM HYDROCHLORIDE 2 MG: 1 INJECTION, SOLUTION INTRAMUSCULAR; INTRAVENOUS at 14:24

## 2019-10-02 RX ADMIN — HYDROMORPHONE HYDROCHLORIDE 1 MG: 1 INJECTION, SOLUTION INTRAMUSCULAR; INTRAVENOUS; SUBCUTANEOUS at 17:45

## 2019-10-02 RX ADMIN — FAMOTIDINE 20 MG: 10 INJECTION INTRAVENOUS at 13:34

## 2019-10-02 RX ADMIN — FENTANYL CITRATE 50 MCG: 50 INJECTION INTRAMUSCULAR; INTRAVENOUS at 15:34

## 2019-10-02 NOTE — ANESTHESIA POSTPROCEDURE EVALUATION
Patient: Darlene Gaviria    Procedure Summary     Date:  10/02/19 Room / Location:   [facility] 09 /  [facility]    Anesthesia Start:  1334 Anesthesia Stop:  1522    Procedure:  LEFT VOCAL CORD MEDIALIZATION , FLEX BRONCHOSCOPY ( 1.5 HOURS WILL NEED ECKERT MEDIALIZATION TRAY WITH 23 HOURS OBS ) (Left Neck) Diagnosis:       Chronic laryngitis      Paralysis of larynx      Dysphagia, pharyngoesophageal phase      (Chronic laryngitis [J37.0])      (Paralysis of larynx [J38.00])      (Dysphagia, pharyngoesophageal phase [R13.14])    Surgeon:  Reginald Arguelles MD Provider:  Surjit Puckett MD    Anesthesia Type:  MAC ASA Status:  3          Anesthesia Type: MAC  Last vitals  BP   158/93 (10/02/19 1538)   Temp   97 °F (36.1 °C) (10/02/19 1523)   Pulse   67 (10/02/19 1538)   Resp   16 (10/02/19 1538)     SpO2   99 % (10/02/19 1538)     Post Anesthesia Care and Evaluation    Patient location during evaluation: PHASE II  Patient participation: complete - patient participated  Level of consciousness: awake and alert  Pain score: 1  Pain management: adequate  Airway patency: patent  Anesthetic complications: No anesthetic complications  PONV Status: controlled  Cardiovascular status: acceptable  Respiratory status: acceptable  Hydration status: acceptable

## 2019-10-02 NOTE — ANESTHESIA PREPROCEDURE EVALUATION
Anesthesia Evaluation     Patient summary reviewed and Nursing notes reviewed   no history of anesthetic complications:  NPO Solid Status: > 8 hours  NPO Liquid Status: > 8 hours           Airway   Mallampati: II  TM distance: >3 FB  Neck ROM: full  no difficulty expected  Dental - normal exam     Pulmonary - normal exam   (+) asthma,   (-) not a smoker  Cardiovascular - normal exam  Exercise tolerance: good (4-7 METS)    NYHA Classification: II  Patient on routine beta blocker and Beta blocker given within 24 hours of surgery    (+) hypertension,   (-) past MI, dysrhythmias, angina, CHF      Neuro/Psych- negative ROS  (-) seizures, CVA  GI/Hepatic/Renal/Endo    (+)  GERD,    (-) liver disease, no renal disease, diabetes, hypothyroidism    Musculoskeletal     Abdominal  - normal exam    Bowel sounds: normal.   Substance History - negative use     OB/GYN negative ob/gyn ROS         Other   (+) arthritis   history of cancer                    Anesthesia Plan    ASA 3     MAC     intravenous induction   Anesthetic plan, all risks, benefits, and alternatives have been provided, discussed and informed consent has been obtained with: patient and spouse/significant other.  Use of blood products discussed with patient and spouse/significant other  Consented to blood products.

## 2019-10-03 VITALS
TEMPERATURE: 97.2 F | HEART RATE: 92 BPM | OXYGEN SATURATION: 95 % | RESPIRATION RATE: 18 BRPM | HEIGHT: 68 IN | SYSTOLIC BLOOD PRESSURE: 112 MMHG | WEIGHT: 195 LBS | BODY MASS INDEX: 29.55 KG/M2 | DIASTOLIC BLOOD PRESSURE: 76 MMHG

## 2019-10-03 LAB
ANION GAP SERPL CALCULATED.3IONS-SCNC: 13.8 MMOL/L (ref 5–15)
BASOPHILS # BLD AUTO: 0 10*3/MM3 (ref 0–0.2)
BASOPHILS NFR BLD AUTO: 0 % (ref 0–1.5)
BUN BLD-MCNC: 12 MG/DL (ref 6–20)
BUN/CREAT SERPL: 19.7 (ref 7–25)
CALCIUM SPEC-SCNC: 9.4 MG/DL (ref 8.6–10.5)
CHLORIDE SERPL-SCNC: 101 MMOL/L (ref 98–107)
CO2 SERPL-SCNC: 23.2 MMOL/L (ref 22–29)
CREAT BLD-MCNC: 0.61 MG/DL (ref 0.57–1)
DEPRECATED RDW RBC AUTO: 42.9 FL (ref 37–54)
EOSINOPHIL # BLD AUTO: 0 10*3/MM3 (ref 0–0.4)
EOSINOPHIL NFR BLD AUTO: 0 % (ref 0.3–6.2)
ERYTHROCYTE [DISTWIDTH] IN BLOOD BY AUTOMATED COUNT: 13.2 % (ref 12.3–15.4)
GFR SERPL CREATININE-BSD FRML MDRD: 103 ML/MIN/1.73
GLUCOSE BLD-MCNC: 146 MG/DL (ref 65–99)
HCT VFR BLD AUTO: 42.2 % (ref 34–46.6)
HGB BLD-MCNC: 13.5 G/DL (ref 12–15.9)
IMM GRANULOCYTES # BLD AUTO: 0.02 10*3/MM3 (ref 0–0.05)
IMM GRANULOCYTES NFR BLD AUTO: 0.2 % (ref 0–0.5)
LYMPHOCYTES # BLD AUTO: 0.84 10*3/MM3 (ref 0.7–3.1)
LYMPHOCYTES NFR BLD AUTO: 7.6 % (ref 19.6–45.3)
MCH RBC QN AUTO: 29 PG (ref 26.6–33)
MCHC RBC AUTO-ENTMCNC: 32 G/DL (ref 31.5–35.7)
MCV RBC AUTO: 90.8 FL (ref 79–97)
MONOCYTES # BLD AUTO: 0.07 10*3/MM3 (ref 0.1–0.9)
MONOCYTES NFR BLD AUTO: 0.6 % (ref 5–12)
NEUTROPHILS # BLD AUTO: 10.17 10*3/MM3 (ref 1.7–7)
NEUTROPHILS NFR BLD AUTO: 91.6 % (ref 42.7–76)
PLATELET # BLD AUTO: 177 10*3/MM3 (ref 140–450)
PMV BLD AUTO: 11.7 FL (ref 6–12)
POTASSIUM BLD-SCNC: 3.9 MMOL/L (ref 3.5–5.2)
RBC # BLD AUTO: 4.65 10*6/MM3 (ref 3.77–5.28)
SODIUM BLD-SCNC: 138 MMOL/L (ref 136–145)
WBC NRBC COR # BLD: 11.1 10*3/MM3 (ref 3.4–10.8)

## 2019-10-03 PROCEDURE — 90674 CCIIV4 VAC NO PRSV 0.5 ML IM: CPT | Performed by: INTERNAL MEDICINE

## 2019-10-03 PROCEDURE — G0008 ADMIN INFLUENZA VIRUS VAC: HCPCS | Performed by: INTERNAL MEDICINE

## 2019-10-03 PROCEDURE — 25010000002 MORPHINE PER 10 MG: Performed by: OTOLARYNGOLOGY

## 2019-10-03 PROCEDURE — 25010000002 INFLUENZA VAC SUBUNIT QUAD 0.5 ML SUSPENSION PREFILLED SYRINGE: Performed by: INTERNAL MEDICINE

## 2019-10-03 PROCEDURE — 80048 BASIC METABOLIC PNL TOTAL CA: CPT | Performed by: PHYSICIAN ASSISTANT

## 2019-10-03 PROCEDURE — 25010000002 DEXAMETHASONE PER 1 MG: Performed by: OTOLARYNGOLOGY

## 2019-10-03 PROCEDURE — 99238 HOSP IP/OBS DSCHRG MGMT 30/<: CPT | Performed by: INTERNAL MEDICINE

## 2019-10-03 PROCEDURE — 94799 UNLISTED PULMONARY SVC/PX: CPT

## 2019-10-03 PROCEDURE — 85025 COMPLETE CBC W/AUTO DIFF WBC: CPT | Performed by: PHYSICIAN ASSISTANT

## 2019-10-03 RX ORDER — HYDROCODONE BITARTRATE AND ACETAMINOPHEN 5; 325 MG/1; MG/1
1 TABLET ORAL EVERY 4 HOURS PRN
Qty: 12 TABLET | Refills: 0 | Status: SHIPPED | OUTPATIENT
Start: 2019-10-03 | End: 2019-10-12

## 2019-10-03 RX ORDER — CEPHALEXIN 500 MG/1
500 CAPSULE ORAL 3 TIMES DAILY
Qty: 21 CAPSULE | Refills: 0 | Status: SHIPPED | OUTPATIENT
Start: 2019-10-03 | End: 2022-03-17

## 2019-10-03 RX ORDER — METHYLPREDNISOLONE 4 MG/1
TABLET ORAL
Qty: 21 TABLET | Refills: 0 | Status: SHIPPED | OUTPATIENT
Start: 2019-10-03 | End: 2022-03-17

## 2019-10-03 RX ADMIN — FAMOTIDINE 40 MG: 20 TABLET, FILM COATED ORAL at 08:28

## 2019-10-03 RX ADMIN — DEXAMETHASONE SODIUM PHOSPHATE 10 MG: 4 INJECTION, SOLUTION INTRAMUSCULAR; INTRAVENOUS at 05:59

## 2019-10-03 RX ADMIN — MORPHINE SULFATE 2 MG: 2 INJECTION, SOLUTION INTRAMUSCULAR; INTRAVENOUS at 05:59

## 2019-10-03 RX ADMIN — HYDROCODONE BITARTRATE AND ACETAMINOPHEN 1 TABLET: 5; 325 TABLET ORAL at 10:02

## 2019-10-03 RX ADMIN — DEXAMETHASONE SODIUM PHOSPHATE 10 MG: 4 INJECTION, SOLUTION INTRAMUSCULAR; INTRAVENOUS at 00:07

## 2019-10-03 RX ADMIN — ONDANSETRON 4 MG: 4 TABLET, FILM COATED ORAL at 00:41

## 2019-10-03 RX ADMIN — CARVEDILOL 6.25 MG: 6.25 TABLET, FILM COATED ORAL at 08:28

## 2019-10-03 RX ADMIN — AMLODIPINE BESYLATE 5 MG: 5 TABLET ORAL at 08:28

## 2019-10-03 RX ADMIN — CETIRIZINE HYDROCHLORIDE 10 MG: 10 TABLET, FILM COATED ORAL at 08:28

## 2019-10-03 RX ADMIN — SODIUM CHLORIDE, POTASSIUM CHLORIDE, SODIUM LACTATE AND CALCIUM CHLORIDE 100 ML/HR: 600; 310; 30; 20 INJECTION, SOLUTION INTRAVENOUS at 06:12

## 2019-10-03 RX ADMIN — SODIUM CHLORIDE, PRESERVATIVE FREE 10 ML: 5 INJECTION INTRAVENOUS at 00:19

## 2019-10-03 RX ADMIN — INFLUENZA A VIRUS A/SINGAPORE/GP1908/2015 IVR-180 (H1N1) ANTIGEN (MDCK CELL DERIVED, PROPIOLACTONE INACTIVATED), INFLUENZA A VIRUS A/NORTH CAROLINA/04/2016 (H3N2) HEMAGGLUTININ ANTIGEN (MDCK CELL DERIVED, PROPIOLACTONE INACTIVATED), INFLUENZA B VIRUS B/IOWA/06/2017 HEMAGGLUTININ ANTIGEN (MDCK CELL DERIVED, PROPIOLACTONE INACTIVATED), INFLUENZA B VIRUS B/SINGAPORE/INFTT-16-0610/2016 HEMAGGLUTININ ANTIGEN (MDCK CELL DERIVED, PROPIOLACTONE INACTIVATED) 0.5 ML: 15; 15; 15; 15 INJECTION, SUSPENSION INTRAMUSCULAR at 10:49

## 2019-10-03 RX ADMIN — SODIUM CHLORIDE, PRESERVATIVE FREE 10 ML: 5 INJECTION INTRAVENOUS at 08:28

## 2019-10-03 RX ADMIN — CARVEDILOL 6.25 MG: 6.25 TABLET, FILM COATED ORAL at 00:07

## 2020-03-12 ENCOUNTER — OFFICE VISIT (OUTPATIENT)
Dept: PULMONOLOGY | Facility: CLINIC | Age: 55
End: 2020-03-12

## 2020-03-12 VITALS
HEART RATE: 69 BPM | BODY MASS INDEX: 30.01 KG/M2 | WEIGHT: 198 LBS | SYSTOLIC BLOOD PRESSURE: 128 MMHG | HEIGHT: 68 IN | DIASTOLIC BLOOD PRESSURE: 90 MMHG | OXYGEN SATURATION: 98 % | TEMPERATURE: 98 F

## 2020-03-12 DIAGNOSIS — C85.92 NON-HODGKIN LYMPHOMA OF INTRATHORACIC LYMPH NODES, UNSPECIFIED NON-HODGKIN LYMPHOMA TYPE (HCC): ICD-10-CM

## 2020-03-12 DIAGNOSIS — J45.20 MILD INTERMITTENT ASTHMA WITHOUT COMPLICATION: Primary | ICD-10-CM

## 2020-03-12 DIAGNOSIS — J38.00 PARALYZED VOCAL CORDS: ICD-10-CM

## 2020-03-12 PROCEDURE — 99212 OFFICE O/P EST SF 10 MIN: CPT | Performed by: INTERNAL MEDICINE

## 2020-03-12 NOTE — PROGRESS NOTES
Subjective   Chief Complaint   Patient presents with   • Asthma       Darlene Gaviria is a 55 y.o. female     History of Present Illness is a 55-year-old female returning for follow-up of mild asthma that is under control she takes singular once a day and rarely needs to use Ventolin inhaler past history is significant for having a non-Hodgkin's lymphoma about 11 years ago for which ended up having mediastinal exploration and lymph node resection and ended up having care vocal cord paralysis that was repaired about 6 months ago by Dr. Lema of ENT she also has high blood pressure that is controlled with Coreg and amlodipine    Review of Systems the voice is better since surgical intervention and no choking some shortness of breath that requires occasional Ventolin inhaler    Family History   Problem Relation Age of Onset   • Hypertension Mother    • Hypothyroidism Mother    • Hypertension Father    • Diabetes Father    • Heart disease Father    • Hypothyroidism Father    • Heart disease Maternal Grandmother    • Hypertension Maternal Grandmother    • Heart disease Maternal Grandfather    • Diabetes Maternal Grandfather    • Hypertension Maternal Grandfather    • Heart disease Paternal Grandmother    • Hypertension Paternal Grandmother    • Heart disease Paternal Grandfather    • Hypertension Paternal Grandfather        Past Medical History:   Diagnosis Date   • Arthritis    • Asthma    • GERD (gastroesophageal reflux disease)    • History of shingles     frequently has shingles   • Hypertension    • Non Hodgkin's lymphoma (CMS/HCC)     surgery to remove cancer, part of left lung @ [place], [place]       Past Surgical History:   Procedure Laterality Date   • ABDOMINAL SURGERY     • APPENDECTOMY     • BREAST BIOPSY     •  SECTION     • CHEST SURGERY      thymus gland-left lung-vein removal left arm   • COLONOSCOPY     • ENDOSCOPY     • GALLBLADDER SURGERY     • HYSTERECTOMY     • LARYNGOPLASTY Left  10/2/2019    Procedure: LEFT VOCAL CORD MEDIALIZATION , FLEX BRONCHOSCOPY ( 1.5 HOURS WILL NEED ECKERT MEDIALIZATION TRAY WITH 23 HOURS OBS );  Surgeon: Reginald Arguelles MD;  Location[facility];  Service: ENT   • OTHER SURGICAL HISTORY      Diaphragm and left vocal cord paralysed.   • OTHER SURGICAL HISTORY      left vocal cord medialization        Social History     Socioeconomic History   • Marital status:      Spouse name: Not on file   • Number of children: Not on file   • Years of education: Not on file   • Highest education level: Not on file   Occupational History   • Occupation: Disabled   Tobacco Use   • Smoking status: Never Smoker   • Smokeless tobacco: Never Used   Substance and Sexual Activity   • Alcohol use: No   • Drug use: No   • Sexual activity: Defer   Social History Narrative    Caffeine:3 serving per day.        Physical Exam: No acute distress vital signs are stable wound is slightly hoarse however lungs are clear and heart is regular blood pressure 128/90 O2 sat 98%    PFT: PFT today usually shows mild restrictive impairment    Imaging: Chest x-ray was August 2019 showing elevated left hemidiaphragm and volume loss on the left side as result of left vocal cord paralysis    Other Labs:       ASSESSMENT1-asthma mild and controlled2-non-Hodgkin lymphoma in remission3-vocal cord paralysis left side as a result of edema setting anoscopy 11 years ago repaired 6 months ago and seem to be stable      Recommendations: Renew current medications reminded her of getting annual flu vaccine    Follow up: Return in 6months if working for not be followed by family physician

## 2020-08-28 RX ORDER — BUDESONIDE AND FORMOTEROL FUMARATE DIHYDRATE 160; 4.5 UG/1; UG/1
2 AEROSOL RESPIRATORY (INHALATION)
Qty: 10.2 G | Refills: 5 | Status: SHIPPED | OUTPATIENT
Start: 2020-08-28 | End: 2022-03-17

## 2020-09-16 ENCOUNTER — OFFICE VISIT (OUTPATIENT)
Dept: PULMONOLOGY | Facility: CLINIC | Age: 55
End: 2020-09-16

## 2020-09-16 DIAGNOSIS — J45.20 MILD INTERMITTENT ASTHMA WITHOUT COMPLICATION: Primary | ICD-10-CM

## 2020-09-16 DIAGNOSIS — C85.92 NON-HODGKIN LYMPHOMA OF INTRATHORACIC LYMPH NODES, UNSPECIFIED NON-HODGKIN LYMPHOMA TYPE (HCC): ICD-10-CM

## 2020-09-16 PROCEDURE — 99441 PR PHYS/QHP TELEPHONE EVALUATION 5-10 MIN: CPT | Performed by: INTERNAL MEDICINE

## 2020-09-16 RX ORDER — LEVOCETIRIZINE DIHYDROCHLORIDE 5 MG/1
TABLET, FILM COATED ORAL
COMMUNITY
Start: 2020-09-01 | End: 2022-03-17

## 2020-09-16 RX ORDER — VALSARTAN 80 MG/1
TABLET ORAL DAILY
COMMUNITY
Start: 2020-09-01 | End: 2022-03-17

## 2020-09-16 NOTE — PROGRESS NOTES
Subjective   Chief Complaint   Patient presents with   • Asthma       Darlene Gaviria is a 55 y.o. female     History of Present Illness Mr. Darlene Gaviria consented to telephone contact called her in the morning of Wednesday,  and talk to her on the phone for 10 minutes she is a 55-year-old lady with asthma for many years was well controlled with Symbicort and Ventolin however more trouble recently and got some allergy medications and has tried Singulair in the past that was helpful.  Past history significant for mediastinal lymph nodes that were diagnosed as lymphoma in  treated with chemotherapy and is under remission has not been followed by an oncologist for 2 years    Review of Systems having more trouble with asthma with cough congestion and wheezing does not have a nebulizer now with her albuterol rescue inhaler couple of times a day    Family History   Problem Relation Age of Onset   • Hypertension Mother    • Hypothyroidism Mother    • Hypertension Father    • Diabetes Father    • Heart disease Father    • Hypothyroidism Father    • Heart disease Maternal Grandmother    • Hypertension Maternal Grandmother    • Heart disease Maternal Grandfather    • Diabetes Maternal Grandfather    • Hypertension Maternal Grandfather    • Heart disease Paternal Grandmother    • Hypertension Paternal Grandmother    • Heart disease Paternal Grandfather    • Hypertension Paternal Grandfather        Past Medical History:   Diagnosis Date   • Arthritis    • Asthma    • GERD (gastroesophageal reflux disease)    • History of shingles     frequently has shingles   • Hypertension    • Non Hodgkin's lymphoma (CMS/HCC)     surgery to remove cancer, part of left lung @ [place], [place]       Past Surgical History:   Procedure Laterality Date   • ABDOMINAL SURGERY     • APPENDECTOMY     • BREAST BIOPSY     •  SECTION     • CHEST SURGERY      thymus gland-left lung-vein removal left arm   • COLONOSCOPY      • ENDOSCOPY     • GALLBLADDER SURGERY     • HYSTERECTOMY     • LARYNGOPLASTY Left 10/2/2019    Procedure: LEFT VOCAL CORD MEDIALIZATION , FLEX BRONCHOSCOPY ( 1.5 HOURS WILL NEED ECKERT MEDIALIZATION TRAY WITH 23 HOURS OBS );  Surgeon: Reginald Arguelles MD;  Location: [facility];  Service: ENT   • OTHER SURGICAL HISTORY      Diaphragm and left vocal cord paralysed.   • OTHER SURGICAL HISTORY      left vocal cord medialization        Social History     Socioeconomic History   • Marital status:      Spouse name: Not on file   • Number of children: Not on file   • Years of education: Not on file   • Highest education level: Not on file   Occupational History   • Occupation: Disabled   Tobacco Use   • Smoking status: Never Smoker   • Smokeless tobacco: Never Used   Substance and Sexual Activity   • Alcohol use: No   • Drug use: No   • Sexual activity: Defer   Social History Narrative    Caffeine:3 serving per day.        Physical Exam: No physical exam as it was telephone conversation did not seem to be any distress though had some coughing during conversation    PFT: PFT in the office usually shows mixed restrictive obstructive pattern    Imaging: Chest x-ray is show elevated left hemidiaphragm as a result of her mediastinal intervention in 2008    Other Labs:       ASSESSMENT1-asthma that does not seem to be fully controlled2-history of lymphoma treated 2008 seemingly in remission        Recommendations: Renew singular and continue substitute of Symbicort such as Advair to whether her insurance covers and Ventolin rescue inhaler and will order new nebulizer and albuterol solution to use as needed up to 4 times a day  Instructed her to get the flu shot as soon as possible and COVID vaccine when available  Follow up: 3 months in the office or on call  Talk to her on the phone for 10 minutes and seemed to understand all instructions

## 2020-10-02 ENCOUNTER — TRANSCRIBE ORDERS (OUTPATIENT)
Dept: PULMONOLOGY | Facility: CLINIC | Age: 55
End: 2020-10-02

## 2020-10-02 DIAGNOSIS — R06.02 SOB (SHORTNESS OF BREATH): ICD-10-CM

## 2020-10-02 DIAGNOSIS — J45.20 MILD INTERMITTENT ASTHMA WITHOUT COMPLICATION: Primary | ICD-10-CM

## 2021-02-01 RX ORDER — MONTELUKAST SODIUM 10 MG/1
TABLET ORAL
Qty: 30 TABLET | Refills: 2 | Status: SHIPPED | OUTPATIENT
Start: 2021-02-01 | End: 2021-05-04

## 2021-02-18 ENCOUNTER — APPOINTMENT (OUTPATIENT)
Dept: BONE DENSITY | Facility: HOSPITAL | Age: 56
End: 2021-02-18

## 2021-04-12 ENCOUNTER — HOSPITAL ENCOUNTER (OUTPATIENT)
Dept: BONE DENSITY | Facility: HOSPITAL | Age: 56
Discharge: HOME | End: 2021-04-12

## 2021-04-12 ENCOUNTER — HOSPITAL ENCOUNTER (OUTPATIENT)
Dept: MAMMOGRAPHY | Facility: HOSPITAL | Age: 56
Discharge: HOME | End: 2021-04-12

## 2021-04-12 DIAGNOSIS — Z12.31 VISIT FOR SCREENING MAMMOGRAM: ICD-10-CM

## 2021-04-12 DIAGNOSIS — Z78.0 POSTMENOPAUSAL: ICD-10-CM

## 2021-04-12 PROCEDURE — 77080 DXA BONE DENSITY AXIAL: CPT

## 2021-04-12 PROCEDURE — 77080 DXA BONE DENSITY AXIAL: CPT | Performed by: RADIOLOGY

## 2021-05-04 RX ORDER — MONTELUKAST SODIUM 10 MG/1
TABLET ORAL
Qty: 30 TABLET | Refills: 2 | Status: SHIPPED | OUTPATIENT
Start: 2021-05-04 | End: 2021-08-06

## 2021-08-06 RX ORDER — MONTELUKAST SODIUM 10 MG/1
TABLET ORAL
Qty: 30 TABLET | Refills: 11 | Status: SHIPPED | OUTPATIENT
Start: 2021-08-06 | End: 2022-03-17

## 2021-09-01 ENCOUNTER — HOSPITAL ENCOUNTER (OUTPATIENT)
Dept: INFUSION THERAPY | Facility: HOSPITAL | Age: 56
Discharge: HOME | End: 2021-09-01
Admitting: NURSE PRACTITIONER

## 2021-09-01 VITALS
OXYGEN SATURATION: 98 % | TEMPERATURE: 98.1 F | DIASTOLIC BLOOD PRESSURE: 88 MMHG | HEART RATE: 104 BPM | RESPIRATION RATE: 18 BRPM | SYSTOLIC BLOOD PRESSURE: 130 MMHG

## 2021-09-01 DIAGNOSIS — U07.1 COVID-19: Primary | ICD-10-CM

## 2021-09-01 PROCEDURE — 25010000006 INJECTION, CASIRIVIMAB AND IMDEVIMAB, 1200 MG: Performed by: NURSE PRACTITIONER

## 2021-09-01 PROCEDURE — M0243 CASIRIVI AND IMDEVI INFUSION: HCPCS | Performed by: NURSE PRACTITIONER

## 2021-09-01 RX ORDER — METHYLPREDNISOLONE SODIUM SUCCINATE 125 MG/2ML
125 INJECTION, POWDER, LYOPHILIZED, FOR SOLUTION INTRAMUSCULAR; INTRAVENOUS AS NEEDED
Status: CANCELLED | OUTPATIENT
Start: 2021-09-01

## 2021-09-01 RX ORDER — EPINEPHRINE 1 MG/ML
0.3 INJECTION, SOLUTION INTRAMUSCULAR; SUBCUTANEOUS AS NEEDED
Status: CANCELLED | OUTPATIENT
Start: 2021-09-01

## 2021-09-01 RX ORDER — METHYLPREDNISOLONE SODIUM SUCCINATE 125 MG/2ML
125 INJECTION, POWDER, LYOPHILIZED, FOR SOLUTION INTRAMUSCULAR; INTRAVENOUS AS NEEDED
Status: DISCONTINUED | OUTPATIENT
Start: 2021-09-01 | End: 2021-09-03 | Stop reason: HOSPADM

## 2021-09-01 RX ORDER — DIPHENHYDRAMINE HYDROCHLORIDE 50 MG/ML
50 INJECTION INTRAMUSCULAR; INTRAVENOUS AS NEEDED
Status: DISCONTINUED | OUTPATIENT
Start: 2021-09-01 | End: 2021-09-03 | Stop reason: HOSPADM

## 2021-09-01 RX ORDER — EPINEPHRINE 1 MG/ML
0.3 INJECTION, SOLUTION INTRAMUSCULAR; SUBCUTANEOUS AS NEEDED
Status: DISCONTINUED | OUTPATIENT
Start: 2021-09-01 | End: 2021-09-03 | Stop reason: HOSPADM

## 2021-09-01 RX ORDER — DIPHENHYDRAMINE HYDROCHLORIDE 50 MG/ML
50 INJECTION INTRAMUSCULAR; INTRAVENOUS AS NEEDED
Status: CANCELLED | OUTPATIENT
Start: 2021-09-01

## 2021-09-01 RX ADMIN — CASIRIVIMAB AND IMDEVIMAB: 600; 600 INJECTION, SOLUTION, CONCENTRATE INTRAVENOUS at 11:30

## 2021-09-01 NOTE — PATIENT INSTRUCTIONS
MD has discussed The FDA has authorized the emergency use of REGN-COV2  , which is not an FDA approved drug. Discussions with the patient regarding the risks and benefits of  REGN-COV2  have occurred. The patient recognizes that this is an investigational treatment which may offer significant known and potential benefits and risks, the extent of which are unknown. Information on available alternative treatments and the risks and benefits of those alternatives was discussed. The patient received the “Fact Sheet for Patients Parents and Caregivers”. All questions from the patient were answered to satisfaction. The patient has the option to accept or refuse treatment with REGN-COV2 and would like to accept treatment.  Education handouts have been given to patient.    Counseling regarding continued self isolation and use of infection control measures according to the CDC guidelines has occurred.

## 2022-03-17 ENCOUNTER — HOSPITAL ENCOUNTER (OUTPATIENT)
Dept: CARDIOLOGY | Facility: HOSPITAL | Age: 57
Discharge: HOME | End: 2022-03-17

## 2022-03-17 ENCOUNTER — OFFICE VISIT (OUTPATIENT)
Dept: CARDIOLOGY | Facility: HOSPITAL | Age: 57
End: 2022-03-17

## 2022-03-17 VITALS
RESPIRATION RATE: 18 BRPM | HEIGHT: 68 IN | BODY MASS INDEX: 30.04 KG/M2 | TEMPERATURE: 97 F | HEART RATE: 89 BPM | SYSTOLIC BLOOD PRESSURE: 112 MMHG | OXYGEN SATURATION: 97 % | DIASTOLIC BLOOD PRESSURE: 72 MMHG | WEIGHT: 198.25 LBS

## 2022-03-17 DIAGNOSIS — R06.09 DOE (DYSPNEA ON EXERTION): ICD-10-CM

## 2022-03-17 DIAGNOSIS — R00.2 PALPITATIONS: ICD-10-CM

## 2022-03-17 DIAGNOSIS — R00.2 PALPITATIONS: Primary | ICD-10-CM

## 2022-03-17 DIAGNOSIS — R07.9 CHEST PAIN, UNSPECIFIED TYPE: ICD-10-CM

## 2022-03-17 LAB
QT INTERVAL: 372 MS
QTC INTERVAL: 462 MS

## 2022-03-17 PROCEDURE — 93005 ELECTROCARDIOGRAM TRACING: CPT | Performed by: NURSE PRACTITIONER

## 2022-03-17 PROCEDURE — 93010 ELECTROCARDIOGRAM REPORT: CPT | Performed by: INTERNAL MEDICINE

## 2022-03-17 PROCEDURE — 99214 OFFICE O/P EST MOD 30 MIN: CPT | Performed by: NURSE PRACTITIONER

## 2022-03-17 PROCEDURE — 93246 EXT ECG>7D<15D RECORDING: CPT

## 2022-03-17 RX ORDER — ESOMEPRAZOLE MAGNESIUM 40 MG/1
40 CAPSULE, DELAYED RELEASE ORAL 2 TIMES DAILY
COMMUNITY
End: 2022-10-24

## 2022-03-17 RX ORDER — HYDROCHLOROTHIAZIDE 12.5 MG/1
12.5 TABLET ORAL DAILY
COMMUNITY

## 2022-03-17 RX ORDER — PHENOL 1.4 %
600 AEROSOL, SPRAY (ML) MUCOUS MEMBRANE 2 TIMES DAILY WITH MEALS
COMMUNITY

## 2022-03-17 NOTE — PROGRESS NOTES
[facility] Heart Monitor Documentation    Darlene Gaviria  1965  [id number]  03/17/22      [] ZIO XT Patch  Model [id number] Prescribed for N/A Days    · Serial Number: (N + 9 Digits) N   · Apply-By Date on Box:   · USPS Tracking Number:   · USPS Tracking        [] [employer] BodyGuardian MINI PLUS Mobile Cardiac Telemetry  Model BGMINIPLUS Prescribed for N/A Days    · Serial Number: (BGM + 7 Digits) BGM  · Shipped-By Date on Box:   · UPS Tracking Number: [id number]  · UPS Tracking      [] [employer] Body[employer]ian MINI Holter Monitor  Model BGMINIEL Prescribed for 14 Days    · Serial Number: (7 Digits) [id number]  · Shipped-By Date on Box: 284335  · UPS Tracking Number: [id number]  · UPS Tracking        This monitor was applied to the patient's chest and checked for proper functioning.  Ms. Darlene Gaviria was instructed in the proper use of this monitor.  She was given the opportunity to ask questions and left the office with the device 's instruction manual.    Katy Villela MA, 16:22 EDT, 03/17/22                  [facility]

## 2022-03-17 NOTE — PROGRESS NOTES
"[facility]  Heart and Valve Center    Chief Complaint  Palpitations and Follow-Up    Subjective    History of Present Illness {CC  Problem List  Visit  Diagnosis   Encounters  Notes  Medications  Labs  Result Review Imaging  Media :23}     Darlene Gaviria is a 57 y.o. female with Hypertension, mild asthma, non-Hodgkin's lymphoma, GERD who presents today for evaluation of palpitations.  Patient was evaluated in 2019 for palpitations and wore an event monitor for about 10 days which was negative for arrhythmias. She reports that 2 weeks ago she had vomiting and diarrhea, temp was 104. She stayed overnight at [facility]. Since then she has had increased palpitations, describes as a \"butterfly fluttering\" in her upper chest. She notes associated chest pressure.She reports that chest pressure happens irregardless of palpitations and seems to happen more with physical activity. She reports that she also has had some swelling of her arm and legs and meloxicam helped but worsened after this was stopped. She recently was placed on HCTZ. She reports that she had an \"arrhythmia\" while in the hospital. She notes chronic BRANDT since having left lower lobectomy for non-hodgkins. She says they had to \"resect part of the myocardium as well\"       Cardiac risks: HTN, age     Objective     Vital Signs:   Vitals:    03/17/22 1429   BP: 112/72   BP Location: Right arm   Patient Position: Sitting   Cuff Size: Adult   Pulse: 89   Resp: 18   Temp: 97 °F (36.1 °C)   TempSrc: Temporal   SpO2: 97%   Weight: 89.9 kg (198 lb 4 oz)   Height: 172.7 cm (68\")     Body mass index is 30.14 kg/m².  Physical Exam  Vitals reviewed.   Constitutional:       Appearance: Normal appearance.   HENT:      Head: Normocephalic.   Neck:      Vascular: No carotid bruit.   Cardiovascular:      Rate and Rhythm: Normal rate and regular rhythm.      Pulses: Normal pulses.      Heart sounds: Normal heart sounds, S1 normal and S2 " normal. No murmur heard.  Pulmonary:      Effort: Pulmonary effort is normal. No respiratory distress.      Breath sounds: Normal breath sounds.   Chest:      Chest wall: No tenderness.   Abdominal:      General: Abdomen is flat.      Palpations: Abdomen is soft.   Musculoskeletal:      Cervical back: Neck supple.      Right lower leg: No edema.      Left lower leg: No edema.   Skin:     General: Skin is warm and dry.   Neurological:      General: No focal deficit present.      Mental Status: She is alert and oriented to person, place, and time. Mental status is at baseline.   Psychiatric:         Mood and Affect: Mood normal.         Behavior: Behavior normal.         Thought Content: Thought content normal.              Result Review  Data Reviewed:{ Labs  Result Review  Imaging  Med Tab  Media :23}     Cardiac Event Monitor (04/11/2018 10:13)  ECG 12 Lead (04/10/2018 14:10)     EKG today shows normal sinus rhythm, nonspecific T wave abnormality.  Measured QT was 360, corrected QT interval was 448 using Bazetts formula  Requested records from [facility]         Assessment and Plan {CC Problem List  Visit Diagnosis  ROS  Review (Popup)  Health Maintenance  Quality  BestPractice  Medications  SmartSets  SnapShot Encounters  Media :23}   1. Palpitations    - ECG 12 Lead; Future  - Holter Monitor - 72 Hour Up To 15 Days; Future  - Stress Test With Myocardial Perfusion (1 Day); Future  - Adult Transthoracic Echo Complete W/ Cont if Necessary Per Protocol; Future    2. BRANDT (dyspnea on exertion)    - Stress Test With Myocardial Perfusion (1 Day); Future  - Adult Transthoracic Echo Complete W/ Cont if Necessary Per Protocol; Future    3. Chest pain, unspecified type    - Stress Test With Myocardial Perfusion (1 Day); Future  - Adult Transthoracic Echo Complete W/ Cont if Necessary Per Protocol; Future          Follow Up {Instructions Charge Capture  Follow-up Communications :23}   Return in about  5 weeks (around 4/21/2022) for Monitor results, Video Visit.    Patient was given instructions and counseling regarding her condition or for health maintenance advice. Please see specific information pulled into the AVS if appropriate.  Advised to call the Heart and Valve Center with any questions, concerns, or worsening symptoms.

## 2022-03-24 ENCOUNTER — HOSPITAL ENCOUNTER (OUTPATIENT)
Dept: CARDIOLOGY | Facility: HOSPITAL | Age: 57
Discharge: HOME | End: 2022-03-24
Admitting: NURSE PRACTITIONER

## 2022-03-24 ENCOUNTER — TELEPHONE (OUTPATIENT)
Dept: CARDIOLOGY | Facility: HOSPITAL | Age: 57
End: 2022-03-24

## 2022-03-24 VITALS — OXYGEN SATURATION: 98 %

## 2022-03-24 DIAGNOSIS — R94.39 ABNORMAL NUCLEAR STRESS TEST: Primary | ICD-10-CM

## 2022-03-24 DIAGNOSIS — I20.8 ANGINAL EQUIVALENT: ICD-10-CM

## 2022-03-24 DIAGNOSIS — R06.09 DOE (DYSPNEA ON EXERTION): ICD-10-CM

## 2022-03-24 DIAGNOSIS — R00.2 PALPITATIONS: ICD-10-CM

## 2022-03-24 DIAGNOSIS — R07.9 CHEST PAIN, UNSPECIFIED TYPE: ICD-10-CM

## 2022-03-24 LAB
BH CV REST NUCLEAR ISOTOPE DOSE: 9.6 MCI
BH CV STRESS BP STAGE 2: NORMAL
BH CV STRESS BP STAGE 4: NORMAL
BH CV STRESS COMMENTS STAGE 1: NORMAL
BH CV STRESS DOSE REGADENOSON STAGE 1: 0.4
BH CV STRESS DURATION MIN STAGE 1: 1
BH CV STRESS DURATION MIN STAGE 2: 1
BH CV STRESS DURATION MIN STAGE 3: 1
BH CV STRESS DURATION MIN STAGE 4: 1
BH CV STRESS DURATION SEC STAGE 2: 0
BH CV STRESS HR STAGE 1: 97
BH CV STRESS HR STAGE 2: 122
BH CV STRESS HR STAGE 3: 121
BH CV STRESS HR STAGE 4: 113
BH CV STRESS NUCLEAR ISOTOPE DOSE: 31.3 MCI
BH CV STRESS O2 STAGE 1: 100
BH CV STRESS O2 STAGE 2: 100
BH CV STRESS O2 STAGE 3: 100
BH CV STRESS O2 STAGE 4: 100
BH CV STRESS PROTOCOL 1: NORMAL
BH CV STRESS RECOVERY BP: NORMAL MMHG
BH CV STRESS RECOVERY HR: 105 BPM
BH CV STRESS RECOVERY O2: 99 %
BH CV STRESS STAGE 1: 1
BH CV STRESS STAGE 2: 2
BH CV STRESS STAGE 3: 3
BH CV STRESS STAGE 4: 4
LV EF NUC BP: 73 %
MAXIMAL PREDICTED HEART RATE: 165 BPM
PERCENT MAX PREDICTED HR: 78.18 %
STRESS BASELINE BP: NORMAL MMHG
STRESS BASELINE HR: 90 BPM
STRESS O2 SAT REST: 98 %
STRESS PERCENT HR: 92 %
STRESS POST ESTIMATED WORKLOAD: 1 METS
STRESS POST EXERCISE DUR MIN: 4 MIN
STRESS POST EXERCISE DUR SEC: 0 SEC
STRESS POST O2 SAT PEAK: 100 %
STRESS POST PEAK BP: NORMAL MMHG
STRESS POST PEAK HR: 129 BPM
STRESS TARGET HR: 140 BPM

## 2022-03-24 PROCEDURE — 93018 CV STRESS TEST I&R ONLY: CPT | Performed by: INTERNAL MEDICINE

## 2022-03-24 PROCEDURE — 93017 CV STRESS TEST TRACING ONLY: CPT

## 2022-03-24 PROCEDURE — 25010000002 REGADENOSON 0.4 MG/5ML SOLUTION: Performed by: NURSE PRACTITIONER

## 2022-03-24 PROCEDURE — 78452 HT MUSCLE IMAGE SPECT MULT: CPT | Performed by: INTERNAL MEDICINE

## 2022-03-24 PROCEDURE — 78452 HT MUSCLE IMAGE SPECT MULT: CPT

## 2022-03-24 PROCEDURE — A9500 TC99M SESTAMIBI: HCPCS | Performed by: NURSE PRACTITIONER

## 2022-03-24 PROCEDURE — 0 TECHNETIUM SESTAMIBI: Performed by: NURSE PRACTITIONER

## 2022-03-24 RX ORDER — ASPIRIN 81 MG/1
81 TABLET ORAL DAILY
Start: 2022-03-24 | End: 2022-08-08

## 2022-03-24 RX ADMIN — TECHNETIUM TC 99M SESTAMIBI 1 DOSE: 1 INJECTION INTRAVENOUS at 15:09

## 2022-03-24 RX ADMIN — REGADENOSON 0.4 MG: 0.08 INJECTION, SOLUTION INTRAVENOUS at 15:08

## 2022-03-24 RX ADMIN — TECHNETIUM TC 99M SESTAMIBI 1 DOSE: 1 INJECTION INTRAVENOUS at 13:15

## 2022-03-24 NOTE — TELEPHONE ENCOUNTER
Called patient with stress test results which showed a medium size infarct located in the apex with no significant ischemia noted.  Possible GI artifact.  There were nonspecific ST changes at baseline that worsened to a 0.5 mm horizontal ST depression in the inferolateral leads with stress.  Discussed case with  who recommends LHC if having concerning symptoms or multiple risks vs coronary CTA.  I called her and discussed these options with her.  She tells me that she has exertional dyspnea and chest pressure with minimal activity. Will schedule for LHC. Risks vs benefits discussed. Recommend starting ASA 81mg daily. She is going to have her lipids checked with PCP tomorrow.

## 2022-03-29 ENCOUNTER — TELEPHONE (OUTPATIENT)
Dept: CARDIOLOGY | Facility: HOSPITAL | Age: 57
End: 2022-03-29

## 2022-03-29 DIAGNOSIS — I20.8 ANGINAL EQUIVALENT: ICD-10-CM

## 2022-03-29 DIAGNOSIS — R94.39 ABNORMAL NUCLEAR STRESS TEST: Primary | ICD-10-CM

## 2022-03-29 NOTE — TELEPHONE ENCOUNTER
----- Message from Ashly Scales sent at 3/28/2022 10:38 AM EDT -----  Good morning   :-)      I called and spoke to Mrs Gaviria this morning to get her scheduled and she wants to wait until next week due to husbands work schedule and him getting the day off.    She also wanted to check and make sure that she is currently being treated for a bowel obstruction and is concerned the cath while treating the obstruction will not interfere with each other in any way.      I told her I would double check the obstruction question and will call her as soon as I get the April calendar to get her scheduled pending the response.      Have a great morning      Nancy

## 2022-03-29 NOTE — TELEPHONE ENCOUNTER
Spoke with patient and she reports that she went to ED yesterday and CT scan showed that bowel obstruction has cleared and she is now having normal BMs. She is in GI office now and reports EGD/colonoscopy planned in 2 weeks but needs cardiac clearance. Stress test possibly a false positive due to bowel obstruction. Will do urgent coronary CTA for further clarification. Patient agrees with no further questions or concerns

## 2022-03-30 DIAGNOSIS — M25.562 PAIN IN BOTH KNEES, UNSPECIFIED CHRONICITY: Primary | ICD-10-CM

## 2022-03-30 DIAGNOSIS — M25.572 BILATERAL ANKLE PAIN, UNSPECIFIED CHRONICITY: Primary | ICD-10-CM

## 2022-03-30 DIAGNOSIS — M25.571 BILATERAL ANKLE PAIN, UNSPECIFIED CHRONICITY: Primary | ICD-10-CM

## 2022-03-30 DIAGNOSIS — M25.561 PAIN IN BOTH KNEES, UNSPECIFIED CHRONICITY: Primary | ICD-10-CM

## 2022-03-31 ENCOUNTER — HOSPITAL ENCOUNTER (OUTPATIENT)
Dept: GENERAL RADIOLOGY | Facility: HOSPITAL | Age: 57
Discharge: HOME | End: 2022-03-31
Admitting: FAMILY MEDICINE

## 2022-03-31 ENCOUNTER — OFFICE VISIT (OUTPATIENT)
Dept: ORTHOPEDIC SURGERY | Facility: CLINIC | Age: 57
End: 2022-03-31

## 2022-03-31 VITALS
WEIGHT: 198 LBS | HEIGHT: 68 IN | HEART RATE: 77 BPM | SYSTOLIC BLOOD PRESSURE: 125 MMHG | OXYGEN SATURATION: 95 % | TEMPERATURE: 97.1 F | DIASTOLIC BLOOD PRESSURE: 84 MMHG | BODY MASS INDEX: 30.01 KG/M2

## 2022-03-31 DIAGNOSIS — M72.2 PLANTAR FASCIITIS OF LEFT FOOT: ICD-10-CM

## 2022-03-31 DIAGNOSIS — G89.29 CHRONIC PAIN IN LEFT FOOT: ICD-10-CM

## 2022-03-31 DIAGNOSIS — M77.32 CALCANEAL SPUR OF BOTH FEET: ICD-10-CM

## 2022-03-31 DIAGNOSIS — M25.562 PAIN IN BOTH KNEES, UNSPECIFIED CHRONICITY: ICD-10-CM

## 2022-03-31 DIAGNOSIS — M22.2X2 PATELLOFEMORAL PAIN SYNDROME OF LEFT KNEE: ICD-10-CM

## 2022-03-31 DIAGNOSIS — M79.672 CHRONIC PAIN IN LEFT FOOT: ICD-10-CM

## 2022-03-31 DIAGNOSIS — G89.29 CHRONIC PAIN OF LEFT KNEE: Primary | ICD-10-CM

## 2022-03-31 DIAGNOSIS — M25.572 BILATERAL ANKLE PAIN, UNSPECIFIED CHRONICITY: ICD-10-CM

## 2022-03-31 DIAGNOSIS — M25.571 BILATERAL ANKLE PAIN, UNSPECIFIED CHRONICITY: ICD-10-CM

## 2022-03-31 DIAGNOSIS — M25.561 PAIN IN BOTH KNEES, UNSPECIFIED CHRONICITY: ICD-10-CM

## 2022-03-31 DIAGNOSIS — M76.62 TENDONITIS, ACHILLES, LEFT: ICD-10-CM

## 2022-03-31 DIAGNOSIS — M17.12 PRIMARY OSTEOARTHRITIS OF LEFT KNEE: ICD-10-CM

## 2022-03-31 DIAGNOSIS — M25.562 CHRONIC PAIN OF LEFT KNEE: Primary | ICD-10-CM

## 2022-03-31 DIAGNOSIS — M77.31 CALCANEAL SPUR OF BOTH FEET: ICD-10-CM

## 2022-03-31 PROCEDURE — 73610 X-RAY EXAM OF ANKLE: CPT

## 2022-03-31 PROCEDURE — 73562 X-RAY EXAM OF KNEE 3: CPT | Performed by: RADIOLOGY

## 2022-03-31 PROCEDURE — 73610 X-RAY EXAM OF ANKLE: CPT | Performed by: RADIOLOGY

## 2022-03-31 PROCEDURE — 73562 X-RAY EXAM OF KNEE 3: CPT

## 2022-03-31 PROCEDURE — 99204 OFFICE O/P NEW MOD 45 MIN: CPT | Performed by: FAMILY MEDICINE

## 2022-03-31 RX ORDER — LUBIPROSTONE 8 UG/1
8 CAPSULE ORAL 2 TIMES DAILY WITH MEALS
COMMUNITY
End: 2022-04-19

## 2022-04-06 ENCOUNTER — HOSPITAL ENCOUNTER (OUTPATIENT)
Dept: CARDIOLOGY | Facility: HOSPITAL | Age: 57
End: 2022-04-06

## 2022-04-06 ENCOUNTER — HOSPITAL ENCOUNTER (OUTPATIENT)
Dept: CARDIOLOGY | Facility: HOSPITAL | Age: 57
Discharge: HOME | End: 2022-04-06
Admitting: NURSE PRACTITIONER

## 2022-04-06 VITALS — HEIGHT: 68 IN | BODY MASS INDEX: 30.01 KG/M2 | WEIGHT: 198 LBS

## 2022-04-06 DIAGNOSIS — R07.9 CHEST PAIN, UNSPECIFIED TYPE: ICD-10-CM

## 2022-04-06 DIAGNOSIS — R00.2 PALPITATIONS: ICD-10-CM

## 2022-04-06 DIAGNOSIS — R06.09 DOE (DYSPNEA ON EXERTION): ICD-10-CM

## 2022-04-06 LAB
BH CV ECHO MEAS - AO MAX PG (FULL): 1.1 MMHG
BH CV ECHO MEAS - AO MAX PG: 2.7 MMHG
BH CV ECHO MEAS - AO MEAN PG (FULL): 0.8 MMHG
BH CV ECHO MEAS - AO MEAN PG: 1.8 MMHG
BH CV ECHO MEAS - AO ROOT AREA (BSA CORRECTED): 1.6
BH CV ECHO MEAS - AO ROOT AREA: 8 CM^2
BH CV ECHO MEAS - AO ROOT DIAM: 3.2 CM
BH CV ECHO MEAS - AO V2 MAX: 82.8 CM/SEC
BH CV ECHO MEAS - AO V2 MEAN: 61.9 CM/SEC
BH CV ECHO MEAS - AO V2 VTI: 16.8 CM
BH CV ECHO MEAS - AVA(I,A): 2.2 CM^2
BH CV ECHO MEAS - AVA(I,D): 2.2 CM^2
BH CV ECHO MEAS - AVA(V,A): 2.3 CM^2
BH CV ECHO MEAS - AVA(V,D): 2.3 CM^2
BH CV ECHO MEAS - BSA(HAYCOCK): 2.1 M^2
BH CV ECHO MEAS - BSA: 2 M^2
BH CV ECHO MEAS - BZI_BMI: 30.1 KILOGRAMS/M^2
BH CV ECHO MEAS - BZI_METRIC_HEIGHT: 172.7 CM
BH CV ECHO MEAS - BZI_METRIC_WEIGHT: 89.8 KG
BH CV ECHO MEAS - EDV(CUBED): 81.1 ML
BH CV ECHO MEAS - EDV(MOD-SP2): 67 ML
BH CV ECHO MEAS - EDV(MOD-SP4): 116 ML
BH CV ECHO MEAS - EDV(TEICH): 84.4 ML
BH CV ECHO MEAS - EF(CUBED): 72.3 %
BH CV ECHO MEAS - EF(MOD-BP): 63 %
BH CV ECHO MEAS - EF(MOD-SP2): 65.7 %
BH CV ECHO MEAS - EF(MOD-SP4): 60.3 %
BH CV ECHO MEAS - EF(TEICH): 64.3 %
BH CV ECHO MEAS - ESV(CUBED): 22.5 ML
BH CV ECHO MEAS - ESV(MOD-SP2): 23 ML
BH CV ECHO MEAS - ESV(MOD-SP4): 46 ML
BH CV ECHO MEAS - ESV(TEICH): 30.1 ML
BH CV ECHO MEAS - FS: 34.8 %
BH CV ECHO MEAS - IVS/LVPW: 0.97
BH CV ECHO MEAS - IVSD: 0.62 CM
BH CV ECHO MEAS - LA DIMENSION: 3.5 CM
BH CV ECHO MEAS - LA/AO: 1.1
BH CV ECHO MEAS - LAD MAJOR: 5.4 CM
BH CV ECHO MEAS - LAT PEAK E' VEL: 8 CM/SEC
BH CV ECHO MEAS - LATERAL E/E' RATIO: 9
BH CV ECHO MEAS - LV DIASTOLIC VOL/BSA (35-75): 57 ML/M^2
BH CV ECHO MEAS - LV MASS(C)D: 77.4 GRAMS
BH CV ECHO MEAS - LV MASS(C)DI: 38 GRAMS/M^2
BH CV ECHO MEAS - LV MAX PG: 1.6 MMHG
BH CV ECHO MEAS - LV MEAN PG: 0.95 MMHG
BH CV ECHO MEAS - LV SYSTOLIC VOL/BSA (12-30): 22.6 ML/M^2
BH CV ECHO MEAS - LV V1 MAX: 64.2 CM/SEC
BH CV ECHO MEAS - LV V1 MEAN: 45.5 CM/SEC
BH CV ECHO MEAS - LV V1 VTI: 12.5 CM
BH CV ECHO MEAS - LVIDD: 4.3 CM
BH CV ECHO MEAS - LVIDS: 2.8 CM
BH CV ECHO MEAS - LVLD AP2: 7.1 CM
BH CV ECHO MEAS - LVLD AP4: 7.8 CM
BH CV ECHO MEAS - LVLS AP2: 5.3 CM
BH CV ECHO MEAS - LVLS AP4: 6.1 CM
BH CV ECHO MEAS - LVOT AREA (M): 3.1 CM^2
BH CV ECHO MEAS - LVOT AREA: 3 CM^2
BH CV ECHO MEAS - LVOT DIAM: 2 CM
BH CV ECHO MEAS - LVPWD: 0.63 CM
BH CV ECHO MEAS - MED PEAK E' VEL: 8.2 CM/SEC
BH CV ECHO MEAS - MEDIAL E/E' RATIO: 8.7
BH CV ECHO MEAS - MV A MAX VEL: 57.8 CM/SEC
BH CV ECHO MEAS - MV DEC SLOPE: 511.4 CM/SEC^2
BH CV ECHO MEAS - MV DEC TIME: 0.13 SEC
BH CV ECHO MEAS - MV E MAX VEL: 73.1 CM/SEC
BH CV ECHO MEAS - MV E/A: 1.3
BH CV ECHO MEAS - MV MAX PG: 3.3 MMHG
BH CV ECHO MEAS - MV MEAN PG: 1.8 MMHG
BH CV ECHO MEAS - MV P1/2T MAX VEL: 89.6 CM/SEC
BH CV ECHO MEAS - MV P1/2T: 51.3 MSEC
BH CV ECHO MEAS - MV V2 MAX: 90.9 CM/SEC
BH CV ECHO MEAS - MV V2 MEAN: 64.9 CM/SEC
BH CV ECHO MEAS - MV V2 VTI: 17.4 CM
BH CV ECHO MEAS - MVA P1/2T LCG: 2.5 CM^2
BH CV ECHO MEAS - MVA(P1/2T): 4.3 CM^2
BH CV ECHO MEAS - MVA(VTI): 2.2 CM^2
BH CV ECHO MEAS - PA ACC SLOPE: 398.4 CM/SEC^2
BH CV ECHO MEAS - PA ACC TIME: 0.13 SEC
BH CV ECHO MEAS - PA PR(ACCEL): 18.8 MMHG
BH CV ECHO MEAS - RAP SYSTOLE: 3 MMHG
BH CV ECHO MEAS - RVSP: 18 MMHG
BH CV ECHO MEAS - SI(AO): 66.1 ML/M^2
BH CV ECHO MEAS - SI(CUBED): 28.8 ML/M^2
BH CV ECHO MEAS - SI(LVOT): 18.4 ML/M^2
BH CV ECHO MEAS - SI(MOD-SP2): 21.6 ML/M^2
BH CV ECHO MEAS - SI(MOD-SP4): 34.4 ML/M^2
BH CV ECHO MEAS - SI(TEICH): 26.7 ML/M^2
BH CV ECHO MEAS - SV(AO): 134.6 ML
BH CV ECHO MEAS - SV(CUBED): 58.7 ML
BH CV ECHO MEAS - SV(LVOT): 37.4 ML
BH CV ECHO MEAS - SV(MOD-SP2): 44 ML
BH CV ECHO MEAS - SV(MOD-SP4): 70 ML
BH CV ECHO MEAS - SV(TEICH): 54.3 ML
BH CV ECHO MEAS - TAPSE (>1.6): 11.3 CM
BH CV ECHO MEAS - TR MAX PG: 15 MMHG
BH CV ECHO MEAS - TR MAX VEL: 195 CM/SEC
BH CV ECHO MEASUREMENTS AVERAGE E/E' RATIO: 9.02
BH CV XLRA - TDI S': 7.68 CM/SEC
LEFT ATRIUM VOLUME INDEX: 27 ML/M^2
LEFT ATRIUM VOLUME: 55 ML

## 2022-04-06 PROCEDURE — 93306 TTE W/DOPPLER COMPLETE: CPT

## 2022-04-06 PROCEDURE — 25010000002 SULFUR HEXAFLUORIDE MICROSPH 60.7-25 MG RECONSTITUTED SUSPENSION: Performed by: NURSE PRACTITIONER

## 2022-04-06 PROCEDURE — 93306 TTE W/DOPPLER COMPLETE: CPT | Performed by: INTERNAL MEDICINE

## 2022-04-06 RX ADMIN — SULFUR HEXAFLUORIDE 5 ML: KIT at 17:23

## 2022-04-15 ENCOUNTER — HOSPITAL ENCOUNTER (OUTPATIENT)
Dept: CT IMAGING | Facility: HOSPITAL | Age: 57
Discharge: HOME | End: 2022-04-15
Admitting: NURSE PRACTITIONER

## 2022-04-15 VITALS
BODY MASS INDEX: 30.22 KG/M2 | WEIGHT: 199.4 LBS | DIASTOLIC BLOOD PRESSURE: 82 MMHG | HEART RATE: 92 BPM | SYSTOLIC BLOOD PRESSURE: 116 MMHG | TEMPERATURE: 97.8 F | RESPIRATION RATE: 16 BRPM | OXYGEN SATURATION: 98 % | HEIGHT: 68 IN

## 2022-04-15 DIAGNOSIS — I20.8 ANGINAL EQUIVALENT: ICD-10-CM

## 2022-04-15 DIAGNOSIS — R94.39 ABNORMAL NUCLEAR STRESS TEST: ICD-10-CM

## 2022-04-15 PROCEDURE — 0 IOPAMIDOL PER 1 ML: Performed by: NURSE PRACTITIONER

## 2022-04-15 PROCEDURE — 75574 CT ANGIO HRT W/3D IMAGE: CPT

## 2022-04-15 PROCEDURE — 82565 ASSAY OF CREATININE: CPT

## 2022-04-15 RX ORDER — PREGABALIN 75 MG/1
75 CAPSULE ORAL DAILY
COMMUNITY
End: 2023-02-16

## 2022-04-15 RX ORDER — NITROGLYCERIN 0.4 MG/1
TABLET SUBLINGUAL
Status: COMPLETED
Start: 2022-04-15 | End: 2022-04-15

## 2022-04-15 RX ORDER — SODIUM CHLORIDE 0.9 % (FLUSH) 0.9 %
10 SYRINGE (ML) INJECTION AS NEEDED
Status: DISCONTINUED | OUTPATIENT
Start: 2022-04-15 | End: 2022-04-16 | Stop reason: HOSPADM

## 2022-04-15 RX ORDER — SODIUM CHLORIDE 0.9 % (FLUSH) 0.9 %
3 SYRINGE (ML) INJECTION EVERY 12 HOURS SCHEDULED
Status: DISCONTINUED | OUTPATIENT
Start: 2022-04-15 | End: 2022-04-16 | Stop reason: HOSPADM

## 2022-04-15 RX ADMIN — NITROGLYCERIN: 0.4 TABLET SUBLINGUAL at 11:45

## 2022-04-15 RX ADMIN — IOPAMIDOL 65 ML: 755 INJECTION, SOLUTION INTRAVENOUS at 11:47

## 2022-04-18 NOTE — PROGRESS NOTES
"[facility]  Heart and Valve Center      Mode of Visit: Video  Location of patient: home  You have chosen to receive care through a telehealth visit.  Does the patient consent to use a video/audio connection for your medical care today? Yes  The visit included audio and video interaction. No technical issues occurred during this visit.     Chief Complaint  Follow-up, Palpitations, and Chest Pain    History of Present Illness    Darlene Gaviria is a 57 y.o. female with hypertension, asthma, non-Hodgkin's lymphoma, GERD who presents today for telemedicine follow up on palpitations and chest pain.  Patient was placed in an extended Holter monitor which showed 18 runs of brief SVT with the longest lasting 23 beats. Triggered events were NSR/ST. she had a nuclear stress test which showed a possible medium size infarct in the apex with no evidence of ischemia and increased bowel activity that could have caused some GI artifact.  She was noted to have a bowel obstruction at the time.  She had a CTA of the coronaries showed a total calcium score of 41. She reports that her symptoms have improved significantly. She reports that chest pain has mostly resolved. Only gets an occasional \"twinge\". Palpitations have improved. She reports \"fluttering\" sensation is much less.     Cardiac risks: HTN, age    Objective     Vital Signs:   Vitals:    04/19/22 1340   BP: 125/78   Pulse: 91   Temp: 97.6 °F (36.4 °C)   SpO2: 96%   Weight: 88.5 kg (195 lb)   Height: 172.7 cm (68\")     Body mass index is 29.65 kg/m².    Virtual Visit Physical Exam  Physical Exam  Constitutional:       Appearance: Normal appearance.   HENT:      Head: Normocephalic.   Pulmonary:      Effort: Pulmonary effort is normal. No respiratory distress.   Neurological:      Mental Status: She is alert and oriented to person, place, and time.   Psychiatric:         Mood and Affect: Mood normal.         Behavior: Behavior normal.         Thought " Content: Thought content normal.              Result Review  Data Reviewed:{ Labs  Result Review  Imaging  Med Tab  Media :23}   CT Angiogram Coronary (04/15/2022 11:50)  Adult Transthoracic Echo Complete W/ Cont if Necessary Per Protocol (04/06/2022 16:14)  Extended holter monitor 3/2022 average HR 85, minimum heart rate 62 and maximum heart rate 213 bpm.  There were 18 runs of SVT noted.  Longest episode 23 beats.  Triggered events were normal sinus rhythm and sinus tachycardia           Assessment and Plan {CC Problem List  Visit Diagnosis  ROS  Review (Popup)  [facility] Maintenance  Quality  BestPractice  Medications  SmartSets  SnapShot Encounters  Media :23}   1. Palpitations  Triggered events NSR, ST. She did have some SVT/PAT that did not appear symptomatic. Since palpitations have improved, no further intervention needed.  Possibly could have been triggered by recent GI illness and electrolyte disturbance    2. Paroxysmal SVT (supraventricular tachycardia) (HCC)  Appears to be asymptomatic on heart monitor.  I advised her to call if symptoms worsen and will increase her carvedilol    3. Chest pain, unspecified type  Symptoms have been improving and likely more GI related with recent bowel obstruction, moderate to large hiatal hernia noted on CT imaging.   Her nuclear stress test has been GI artifact, however, CTA showed low coronary calcium score.  Artifact was again noted on CTA, but with low coronary calcium score and no significant stenosis visualized.  No further testing needed since she is currently asymptomatic.  Continue cardiac prevention including good blood pressure, lipid control, healthy diet and exercise    4. Essential hypertension  Appears well controlled  Continue to monitor        Follow Up {Instructions Charge Capture  Follow-up Communications :23}   Return if symptoms worsen or fail to improve.    Patient was given instructions and counseling regarding her condition or for  health maintenance advice. Please see specific information pulled into the AVS if appropriate.  Advised to call the Heart and Valve Center with any questions, concerns, or worsening symptoms.    Dictated Utilizing Dragon Dictation

## 2022-04-19 ENCOUNTER — TELEMEDICINE (OUTPATIENT)
Dept: CARDIOLOGY | Facility: HOSPITAL | Age: 57
End: 2022-04-19

## 2022-04-19 VITALS
SYSTOLIC BLOOD PRESSURE: 125 MMHG | WEIGHT: 195 LBS | DIASTOLIC BLOOD PRESSURE: 78 MMHG | HEART RATE: 91 BPM | HEIGHT: 68 IN | TEMPERATURE: 97.6 F | BODY MASS INDEX: 29.55 KG/M2 | OXYGEN SATURATION: 96 %

## 2022-04-19 DIAGNOSIS — R00.2 PALPITATIONS: Primary | ICD-10-CM

## 2022-04-19 DIAGNOSIS — I47.1 PAROXYSMAL SVT (SUPRAVENTRICULAR TACHYCARDIA): ICD-10-CM

## 2022-04-19 DIAGNOSIS — R07.9 CHEST PAIN, UNSPECIFIED TYPE: ICD-10-CM

## 2022-04-19 DIAGNOSIS — I10 ESSENTIAL HYPERTENSION: ICD-10-CM

## 2022-04-19 PROCEDURE — 99213 OFFICE O/P EST LOW 20 MIN: CPT | Performed by: NURSE PRACTITIONER

## 2022-04-24 LAB — CREAT BLDA-MCNC: 0.9 MG/DL (ref 0.6–1.3)

## 2022-04-28 ENCOUNTER — OFFICE VISIT (OUTPATIENT)
Dept: ORTHOPEDIC SURGERY | Facility: CLINIC | Age: 57
End: 2022-04-28

## 2022-04-28 VITALS
WEIGHT: 195 LBS | HEART RATE: 75 BPM | BODY MASS INDEX: 29.55 KG/M2 | HEIGHT: 68 IN | OXYGEN SATURATION: 96 % | TEMPERATURE: 97.1 F | SYSTOLIC BLOOD PRESSURE: 132 MMHG | DIASTOLIC BLOOD PRESSURE: 88 MMHG

## 2022-04-28 DIAGNOSIS — G89.29 CHRONIC PAIN OF LEFT KNEE: ICD-10-CM

## 2022-04-28 DIAGNOSIS — M77.32 CALCANEAL SPUR OF BOTH FEET: ICD-10-CM

## 2022-04-28 DIAGNOSIS — M25.562 CHRONIC PAIN OF LEFT KNEE: ICD-10-CM

## 2022-04-28 DIAGNOSIS — G89.29 CHRONIC PAIN IN LEFT FOOT: ICD-10-CM

## 2022-04-28 DIAGNOSIS — M17.12 PRIMARY OSTEOARTHRITIS OF LEFT KNEE: ICD-10-CM

## 2022-04-28 DIAGNOSIS — M72.2 PLANTAR FASCIITIS OF LEFT FOOT: ICD-10-CM

## 2022-04-28 DIAGNOSIS — M79.672 CHRONIC PAIN IN LEFT FOOT: ICD-10-CM

## 2022-04-28 DIAGNOSIS — M25.571 BILATERAL ANKLE PAIN, UNSPECIFIED CHRONICITY: Primary | ICD-10-CM

## 2022-04-28 DIAGNOSIS — M77.31 CALCANEAL SPUR OF BOTH FEET: ICD-10-CM

## 2022-04-28 DIAGNOSIS — M25.572 BILATERAL ANKLE PAIN, UNSPECIFIED CHRONICITY: Primary | ICD-10-CM

## 2022-04-28 DIAGNOSIS — M76.62 TENDONITIS, ACHILLES, LEFT: ICD-10-CM

## 2022-04-28 PROCEDURE — 99214 OFFICE O/P EST MOD 30 MIN: CPT | Performed by: FAMILY MEDICINE

## 2022-04-28 RX ORDER — MONTELUKAST SODIUM 10 MG/1
TABLET ORAL
COMMUNITY
Start: 2022-04-15 | End: 2022-08-08

## 2022-05-17 PROCEDURE — 93248 EXT ECG>7D<15D REV&INTERPJ: CPT | Performed by: INTERNAL MEDICINE

## 2022-06-02 ENCOUNTER — OFFICE VISIT (OUTPATIENT)
Dept: ORTHOPEDIC SURGERY | Facility: CLINIC | Age: 57
End: 2022-06-02

## 2022-06-02 VITALS
DIASTOLIC BLOOD PRESSURE: 78 MMHG | TEMPERATURE: 97.1 F | OXYGEN SATURATION: 97 % | SYSTOLIC BLOOD PRESSURE: 119 MMHG | BODY MASS INDEX: 29.55 KG/M2 | HEIGHT: 68 IN | HEART RATE: 75 BPM | WEIGHT: 195 LBS

## 2022-06-02 DIAGNOSIS — M25.572 BILATERAL ANKLE PAIN, UNSPECIFIED CHRONICITY: ICD-10-CM

## 2022-06-02 DIAGNOSIS — M22.2X2 PATELLOFEMORAL PAIN SYNDROME OF LEFT KNEE: ICD-10-CM

## 2022-06-02 DIAGNOSIS — M77.32 CALCANEAL SPUR OF BOTH FEET: ICD-10-CM

## 2022-06-02 DIAGNOSIS — M17.12 PRIMARY OSTEOARTHRITIS OF LEFT KNEE: ICD-10-CM

## 2022-06-02 DIAGNOSIS — M25.571 BILATERAL ANKLE PAIN, UNSPECIFIED CHRONICITY: ICD-10-CM

## 2022-06-02 DIAGNOSIS — G89.29 CHRONIC PAIN IN LEFT FOOT: ICD-10-CM

## 2022-06-02 DIAGNOSIS — G89.29 CHRONIC PAIN OF LEFT KNEE: ICD-10-CM

## 2022-06-02 DIAGNOSIS — M76.62 TENDONITIS, ACHILLES, LEFT: Primary | ICD-10-CM

## 2022-06-02 DIAGNOSIS — M25.562 CHRONIC PAIN OF LEFT KNEE: ICD-10-CM

## 2022-06-02 DIAGNOSIS — M72.2 PLANTAR FASCIITIS OF LEFT FOOT: ICD-10-CM

## 2022-06-02 DIAGNOSIS — M79.672 CHRONIC PAIN IN LEFT FOOT: ICD-10-CM

## 2022-06-02 DIAGNOSIS — M77.52 LEFT CALCANEAL BURSITIS: ICD-10-CM

## 2022-06-02 DIAGNOSIS — M77.31 CALCANEAL SPUR OF BOTH FEET: ICD-10-CM

## 2022-06-02 PROCEDURE — 99214 OFFICE O/P EST MOD 30 MIN: CPT | Performed by: FAMILY MEDICINE

## 2022-06-02 RX ORDER — DICLOFENAC SODIUM 20 MG/G
1 SOLUTION TOPICAL EVERY 12 HOURS PRN
Qty: 8 G | Refills: 0 | COMMUNITY
Start: 2022-06-02 | End: 2022-06-08 | Stop reason: SDUPTHER

## 2022-06-02 RX ORDER — MULTIVIT-MIN/IRON/FOLIC ACID/K 18-600-40
CAPSULE ORAL
COMMUNITY
Start: 2022-05-16

## 2022-06-02 NOTE — PROGRESS NOTES
"Follow Up Visit      Patient: Darlene Gaviria  YOB: 1965  Date of Encounter: 06/02/2022  PCP: Noemi Schreiber APRN  Referring Provider: No ref. provider found     Subjective   Darlene Gaviria is a 57 y.o. female who presents to the office today for evaluation of Follow-up of the Left Knee and Pain, Follow-up, and Edema of the Left Ankle      Chief Complaint   Patient presents with   • Left Knee - Follow-up   • Left Ankle - Pain, Follow-up, Edema       HPI   Patient presents for follow-up for left knee and ankle pain. This has been persistent for 1 year. She reports improvement in her left knee pain. Has been attending physical therapy twice a week for the past month and performing home exercises on her days off. If the pain gets to bad she uses an frozen water bottle. Heats makes the pain worse. Reports that if she has a bad day on her left ankle, her left knee gives her more trouble. She wears her knee brace while driving or is she is standing up a lot. She wears compression socks during the winter. She notes they helped her swelling.  Reports that she is unable to go up and down steps. Reports that her left knee does not give out like it was. Her right ankle and foot pain has resolved, but her left ankle and foot is still \"pretty bad.\" She experiences side and back of the heel pain. She notes the tendon hurts and swells. This is not getting better with therapy. The cream has helped her knee, but does not seem to be helping her ankle and foot. She states she still experiences pain in the mornings, but she is no longer having tingling. She has a history of plantar fasciitis, which she wore a boot for. She denies wearing heels.     She had open sternum surgery 12 years ago. And She was told she could no longer have MRI's.        Patient Active Problem List   Diagnosis   • Mild intermittent asthma without complication   • Essential hypertension   • Chronic laryngitis   • Paralysis of " "larynx   • Dysphagia, pharyngoesophageal phase   • Status post surgery   • COVID-19       Past Medical History:   Diagnosis Date   • Arthritis    • Asthma    • GERD (gastroesophageal reflux disease)    • Gout    • History of shingles     frequently has shingles   • Hx of hysterectomy    • Hypertension    • Non Hodgkin's lymphoma (HCC) 2008    surgery to remove cancer, part of left lung @ [place], [place]   • Osteoarthritis    • Osteoporosis        Allergies   Allergen Reactions   • Metoprolol Other (See Comments)     Leg weakness       Review of Systems   Constitutional: Positive for activity change. Negative for fever.   Respiratory: Negative for shortness of breath and wheezing.    Cardiovascular: Negative for chest pain.   Musculoskeletal: Positive for arthralgias and myalgias.   Skin: Negative for color change and wound.   Neurological: Negative for weakness and numbness.       Visit Vitals  /78   Pulse 75   Temp 97.1 °F (36.2 °C)   Ht 172.7 cm (68\")   Wt 88.5 kg (195 lb)   SpO2 97%   BMI 29.65 kg/m²     57 y.o.female  Physical Exam  Vitals and nursing note reviewed.   Constitutional:       General: She is not in acute distress.     Appearance: Normal appearance.   Pulmonary:      Effort: Pulmonary effort is normal. No respiratory distress.   Musculoskeletal:      Comments: Left lower extremity:  Mild tenderness on the medial malleolus.  Mild soreness on the medial calcaneus at the plantar fascia insertion.  Very tender over Achilles tendon and retrocalcaneal bursa with swelling.  Pain on passive dorsiflexion.  Negative talar tilt and anterior drawer test.    Left knee:  Full range of motion.  Posterolateral corner tenderness.  Otherwise exam is WNL   Skin:     General: Skin is warm and dry.      Findings: No erythema.   Neurological:      General: No focal deficit present.      Mental Status: She is alert.      Sensory: No sensory deficit.      Motor: No weakness.         Radiology Results:    No radiology " results for the last 30 days.    Assessment & Plan   Diagnoses and all orders for this visit:    1. Tendonitis, Achilles, left (Primary)  -     Diclofenac Sodium (Pennsaid) 2 % solution; Apply 1 packet topically Every 12 (Twelve) Hours As Needed (heel pain).  Dispense: 8 g; Refill: 0  -     MRI Ankle Left Without Contrast; Future    2. Left calcaneal bursitis  -     Diclofenac Sodium (Pennsaid) 2 % solution; Apply 1 packet topically Every 12 (Twelve) Hours As Needed (heel pain).  Dispense: 8 g; Refill: 0  -     MRI Ankle Left Without Contrast; Future    3. Chronic pain of left knee    4. Bilateral ankle pain, unspecified chronicity  -     MRI Ankle Left Without Contrast; Future    5. Chronic pain in left foot  -     MRI Ankle Left Without Contrast; Future    6. Primary osteoarthritis of left knee    7. Plantar fasciitis of left foot  -     Diclofenac Sodium (Pennsaid) 2 % solution; Apply 1 packet topically Every 12 (Twelve) Hours As Needed (heel pain).  Dispense: 8 g; Refill: 0  -     MRI Ankle Left Without Contrast; Future    8. Calcaneal spur of both feet  -     MRI Ankle Left Without Contrast; Future    9. Patellofemoral pain syndrome of left knee         MEDS ORDERED DURING VISIT:  No orders of the defined types were placed in this encounter.    MEDICATION ISSUES:  Discussed medication options and treatment plan of prescribed medication as well as the risks, benefits, and side effects including potential falls, possible impaired driving and metabolic adversities among others. Patient is agreeable to call the office with any worsening of symptoms or onset of side effects. Patient is agreeable to call 911 or go to the nearest ER should he/she begin having SI/HI.     Discussion: Patient's left knee and plantar fasciitis pain are largely improved but not completely resolved. However, her left Achilles tendinitis/retrocalcaneal bursitis pain has not improved and continues to be a daily problem. I gave patient samples  of Pennsaid to see if this works any better. Recommend physical therapist do a trial of iontophoresis for this and I would like to go ahead and get a MRI of the patient's ankle to try to find out if there is permanent damage preventing the patient from recovering. Patient does have metal wire in her chest from an old sternum surgery to remove a tumor and is concerned that she may not be able to have an MRI due to this. We will check with radiology and also patient needs to contact her old surgery office for records or give us their name so we can contact them to try to find out if MRI is possible. Patient will let us know as soon as she has this information. If she likes the Pennsaid, she will let us know and we will send in a prescription.           This document has been electronically signed by Elvis Adams DO   June 2, 2022 11:26 EDT    Part of this note may be an electronic transcription/translation of spoken language to printed text using the Dragon Dictation System.    Transcribed from ambient dictation for Elvis Adams DO by Jhoana Ochoa.  06/02/22   16:41 EDT    Patient verbalized consent to the visit recording.

## 2022-06-03 ENCOUNTER — TELEPHONE (OUTPATIENT)
Dept: ORTHOPEDIC SURGERY | Facility: CLINIC | Age: 57
End: 2022-06-03

## 2022-06-03 NOTE — TELEPHONE ENCOUNTER
Provider: RIKI  Caller: [place]  Phone Number: [phone]  Reason for Call:        PT IS REPORTING WE NEED TO SEND A BRITNEY FOR THE  SX NOTES FROM JUN 2008 DR YOUSIF, THORACIC SX TUMER BEHIND BREAST PLATE.     SX WAS DONE AT [facility] MEDICAL RECORDS.   FAX: [phone]    PT MAILING ADDRESS:  [place] [address]   Stage 3: Additional Anesthesia Type: 1% lidocaine with 1:100,000 epinephrine and 408mcg clindamycin/ml and a 1:10 solution of 8.4% sodium bicarbonate

## 2022-06-03 NOTE — TELEPHONE ENCOUNTER
So they want me to call the hospital to get that surgery note about her breast plate and send it to that fax number or am I reading that wrong?

## 2022-06-08 ENCOUNTER — TELEPHONE (OUTPATIENT)
Dept: ORTHOPEDIC SURGERY | Facility: CLINIC | Age: 57
End: 2022-06-08

## 2022-06-08 DIAGNOSIS — M77.52 LEFT CALCANEAL BURSITIS: ICD-10-CM

## 2022-06-08 DIAGNOSIS — M76.62 TENDONITIS, ACHILLES, LEFT: ICD-10-CM

## 2022-06-08 DIAGNOSIS — M72.2 PLANTAR FASCIITIS OF LEFT FOOT: ICD-10-CM

## 2022-06-08 RX ORDER — DICLOFENAC SODIUM 20 MG/G
1 SOLUTION TOPICAL EVERY 12 HOURS PRN
Qty: 112 G | Refills: 3 | Status: SHIPPED | OUTPATIENT
Start: 2022-06-08 | End: 2022-08-08

## 2022-06-08 NOTE — TELEPHONE ENCOUNTER
Called patient to let her know that I have faxed in a request for her records from the surgery and asked if it is safe for her to get the MRI. Waiting to hear back from that and told her that Dr. Adams sent in the Rx for Pennsaid and told her that he said it will come by mail order. Verbalized understanding.

## 2022-08-08 ENCOUNTER — OFFICE VISIT (OUTPATIENT)
Dept: GASTROENTEROLOGY | Facility: CLINIC | Age: 57
End: 2022-08-08

## 2022-08-08 VITALS — HEIGHT: 68 IN | WEIGHT: 195 LBS | BODY MASS INDEX: 29.55 KG/M2

## 2022-08-08 DIAGNOSIS — K44.9 HIATAL HERNIA: Primary | ICD-10-CM

## 2022-08-08 DIAGNOSIS — Z12.11 ENCOUNTER FOR SCREENING FOR MALIGNANT NEOPLASM OF COLON: ICD-10-CM

## 2022-08-08 DIAGNOSIS — K44.9 HIATAL HERNIA: ICD-10-CM

## 2022-08-08 DIAGNOSIS — Z12.11 ENCOUNTER FOR SCREENING FOR MALIGNANT NEOPLASM OF COLON: Primary | ICD-10-CM

## 2022-08-08 PROCEDURE — 99214 OFFICE O/P EST MOD 30 MIN: CPT | Performed by: PHYSICIAN ASSISTANT

## 2022-08-08 RX ORDER — BISACODYL 5 MG/1
20 TABLET, DELAYED RELEASE ORAL ONCE
Qty: 4 TABLET | Refills: 0 | Status: SHIPPED | OUTPATIENT
Start: 2022-08-08 | End: 2022-08-08

## 2022-08-08 RX ORDER — POLYETHYLENE GLYCOL 3350 17 G/17G
510 POWDER, FOR SOLUTION ORAL ONCE
Qty: 510 G | Refills: 0 | Status: SHIPPED | OUTPATIENT
Start: 2022-08-08 | End: 2022-08-08

## 2022-08-08 RX ORDER — MAGNESIUM CARB/ALUMINUM HYDROX 105-160MG
296 TABLET,CHEWABLE ORAL TAKE AS DIRECTED
Qty: 592 ML | Refills: 0 | Status: SHIPPED | OUTPATIENT
Start: 2022-08-08 | End: 2022-09-13

## 2022-08-08 NOTE — PROGRESS NOTES
Chief Complaint   Patient presents with   • Diarrhea   • Constipation       Darlene Gaviria is a 57 y.o. female who presents to the office today for evaluation of Diarrhea and Constipation  .    HPI  Patient presents to the clinic today for evaluation of alternating constipation and diarrhea episodes.  Patient states that a few months back she did have a negative Cologuard test.  However in March of this year she experienced a low-grade fever and nausea, vomiting, and diarrhea for several days.  After that viral infection cleared she suffered from constipation that was normal for her.  She was able to take laxatives and stool softeners which did seem to help somewhat.  Constipation return to what she considered normal until she started experiencing some worsening heart issues.  She had a CT angio that discovered a very large hiatal hernia and significant stool in the colon.  She was seen by her primary care who started her on a sample of Amitiza 24 mcg twice daily which did seem to help somewhat she also has tried magnesium citrate cleanouts in the past.  Bowel habits did not seem to regulate as she was still having episodes of breakthrough diarrhea occurring.  She would go roughly 1 week in between bowel movements.  She was seen at [facility] in which stool studies and imaging was done but she was not given an answer on the cause behind her constipation.  She was originally scheduled with Dr. Rios in [place] however did not want to have the procedure done there and was scheduled with us.  In regards to her hiatal hernia she experiences some pressure in the epigastric area as well as a reflux sensation.  She has been cleared by both her lung and heart doctor recently.  Review of Systems   Constitutional: Negative for fever.   HENT: Positive for trouble swallowing.    Eyes: Negative.    Respiratory: Negative.    Cardiovascular: Negative.    Gastrointestinal: Positive for abdominal distention,  abdominal pain and diarrhea. Negative for anal bleeding, blood in stool, constipation, nausea and vomiting.   Endocrine: Negative.    Genitourinary: Negative.    Musculoskeletal: Negative.    Skin: Negative.    Allergic/Immunologic: Negative.    Neurological: Negative.    Hematological: Negative.    Psychiatric/Behavioral: Negative.        ACTIVE PROBLEMS:   Specialty Problems        Gastroenterology Problems    Dysphagia, pharyngoesophageal phase              PAST MEDICAL HISTORY:  Past Medical History:   Diagnosis Date   • Arthritis    • Asthma    • GERD (gastroesophageal reflux disease)    • Gout    • History of shingles     frequently has shingles   • Hx of hysterectomy    • Hypertension    • Non Hodgkin's lymphoma (HCC) 2008    surgery to remove cancer, part of left lung @ [place], [place]   • Osteoarthritis    • Osteoporosis        SURGICAL HISTORY:  Past Surgical History:   Procedure Laterality Date   • ABDOMINAL SURGERY     • APPENDECTOMY     • BREAST BIOPSY     • CARDIAC SURGERY     •  SECTION     • CHEST SURGERY      thymus gland-left lung-vein removal left arm   • COLONOSCOPY     • ENDOSCOPY     • GALLBLADDER SURGERY     • HYSTERECTOMY     • LARYNGOPLASTY Left 10/02/2019    Procedure: LEFT VOCAL CORD MEDIALIZATION , FLEX BRONCHOSCOPY ( 1.5 HOURS WILL NEED ECKERT MEDIALIZATION TRAY WITH 23 HOURS OBS );  Surgeon: Reginald Arguelles MD;  Location: [facility];  Service: ENT   • OTHER SURGICAL HISTORY      Diaphragm and left vocal cord paralysed.   • OTHER SURGICAL HISTORY      left vocal cord medialization        FAMILY HISTORY:  Family History   Problem Relation Age of Onset   • Osteoporosis Mother    • Hypertension Mother    • Hypothyroidism Mother    • Hypertension Father    • Diabetes Father    • Heart disease Father    • Hypothyroidism Father    • Rheum arthritis Father    • Diabetes Brother    • Osteoporosis Maternal Grandmother    • Heart disease Maternal Grandmother    • Hypertension Maternal  Grandmother    • Heart disease Maternal Grandfather    • Diabetes Maternal Grandfather    • Hypertension Maternal Grandfather    • Heart disease Paternal Grandmother    • Hypertension Paternal Grandmother    • Heart disease Paternal Grandfather    • Hypertension Paternal Grandfather        SOCIAL HISTORY:  Social History     Tobacco Use   • Smoking status: Never Smoker   • Smokeless tobacco: Never Used   Substance Use Topics   • Alcohol use: No     Comment: former use       CURRENT MEDICATION:    Current Outpatient Medications:   •  amLODIPine (NORVASC) 5 MG tablet, Take 5 mg by mouth Daily., Disp: , Rfl:   •  calcium carbonate (OS-CORTNEY) 600 MG tablet, Take 600 mg by mouth 2 (Two) Times a Day With Meals., Disp: , Rfl:   •  carvedilol (COREG) 6.25 MG tablet, Take 6.25 mg by mouth 2 (Two) Times a Day With Meals., Disp: , Rfl:   •  Cholecalciferol (VITAMIN D3 PO), Take  by mouth Daily., Disp: , Rfl:   •  esomeprazole (nexIUM) 40 MG capsule, Take 40 mg by mouth 2 (Two) Times a Day., Disp: , Rfl:   •  hydroCHLOROthiazide (HYDRODIURIL) 12.5 MG tablet, Take 12.5 mg by mouth Daily., Disp: , Rfl:   •  loratadine (CLARITIN) 10 MG tablet, Take 10 mg by mouth Daily., Disp: , Rfl:   •  pregabalin (LYRICA) 75 MG capsule, Take 75 mg by mouth Daily., Disp: , Rfl:   •  ProAir  (90 Base) MCG/ACT inhaler, , Disp: , Rfl:   •  Vitamin D, Cholecalciferol, 25 MCG (1000 UT) tablet, , Disp: , Rfl:   •  bisacodyl (DULCOLAX) 5 MG EC tablet, Take 4 tablets by mouth 1 (One) Time for 1 dose. Take 4 tablets at 4:00 PM the day before procedure, Disp: 4 tablet, Rfl: 0  •  magnesium citrate 1.745 GM/30ML solution solution, Take 296 mL by mouth Take As Directed for 2 doses. Take one full bottle at 4:00 PM and take second bottle at 10:00 PM., Disp: 592 mL, Rfl: 0  •  polyethylene glycol (MIRALAX) 17 GM/SCOOP powder, Take 510 g by mouth 1 (One) Time for 1 dose. Place 15 cap fulls of powder in 32 oz of liquid of choice at 4:00 and 10:00 PM, Disp:  "510 g, Rfl: 0    ALLERGIES:  Metoprolol    VISIT VITALS:  Ht 172.7 cm (68\")   Wt 88.5 kg (195 lb)   BMI 29.65 kg/m²   Physical Exam  Constitutional:       General: She is not in acute distress.     Appearance: Normal appearance. She is well-developed.   HENT:      Head: Normocephalic and atraumatic.   Eyes:      Pupils: Pupils are equal, round, and reactive to light.   Cardiovascular:      Rate and Rhythm: Normal rate and regular rhythm.      Heart sounds: Normal heart sounds.   Pulmonary:      Effort: Pulmonary effort is normal. No respiratory distress.      Breath sounds: Normal breath sounds. No wheezing, rhonchi or rales.   Abdominal:      General: Abdomen is flat. Bowel sounds are normal. There is no distension.      Palpations: Abdomen is soft. There is no mass.      Tenderness: There is no abdominal tenderness. There is no guarding or rebound.      Hernia: No hernia is present.   Musculoskeletal:         General: No swelling. Normal range of motion.      Cervical back: Normal range of motion and neck supple.      Right lower leg: No edema.      Left lower leg: No edema.   Skin:     General: Skin is warm and dry.   Neurological:      Mental Status: She is alert and oriented to person, place, and time.   Psychiatric:         Attention and Perception: Attention normal.         Mood and Affect: Mood normal.         Speech: Speech normal.         Behavior: Behavior normal. Behavior is cooperative.         Thought Content: Thought content normal.         Assessment    Due to the patient's symptoms-I will go ahead and get her scheduled to have an EGD/colonoscopy performed by Dr. To.  Both procedures were thoroughly explained to the patient she voiced understanding and agreement to the treatment plan.  Was also provided samples in clinic of Linzess under 45 mcg once daily for chronic constipation.   Diagnosis Plan   1. Encounter for screening for malignant neoplasm of colon  Case Request    Case Request    " bisacodyl (DULCOLAX) 5 MG EC tablet    magnesium citrate 1.745 GM/30ML solution solution    polyethylene glycol (MIRALAX) 17 GM/SCOOP powder   2. Hiatal hernia  Case Request    Case Request    bisacodyl (DULCOLAX) 5 MG EC tablet    magnesium citrate 1.745 GM/30ML solution solution    polyethylene glycol (MIRALAX) 17 GM/SCOOP powder       Return After procedure.               This document has been electronically signed by Uma Thompson PA-C  August 8, 2022 15:50 EDT    Part of this note may be an electronic transcription/translation of spoken language to printed text using the Dragon Dictation System.

## 2022-08-11 ENCOUNTER — TELEPHONE (OUTPATIENT)
Dept: ORTHOPEDIC SURGERY | Facility: CLINIC | Age: 57
End: 2022-08-11

## 2022-08-11 NOTE — TELEPHONE ENCOUNTER
Caller: MADDI EMERY    Relationship to patient: SELF    Best call back number:  [phone]    Chief complaint: PATIENT CALLING ABOUT LEFT ANKLE MRI ORDER ON 6/2/22. STILL WAITING TO HEAR IF SHE CAN HAVE THE TEST DONE PER RECORDS FROM OTHER DOCTOR. PATIENT SAYS IN A LOT OF PAIN.    Type of visit:  MRI    Requested date:  ASAP

## 2022-08-12 ENCOUNTER — PATIENT ROUNDING (BHMG ONLY) (OUTPATIENT)
Dept: GASTROENTEROLOGY | Facility: CLINIC | Age: 57
End: 2022-08-12

## 2022-08-12 NOTE — PROGRESS NOTES
August 12, 2022    Hello, may I speak with Darlene Gaviria?    My name is Kay Florez       I am  with [facility]  [address] [place] [address] [phone].    Before we get started may I verify your date of birth? 1965    I am calling to officially welcome you to our practice and ask about your recent visit. Is this a good time to talk? yes    Tell me about your visit with us. What things went well?  My visit went very well she was very helpful        We're always looking for ways to make our patients' experiences even better. Do you have recommendations on ways we may improve?  no    Overall were you satisfied with your first visit to our practice? yes       I appreciate you taking the time to speak with me today. Is there anything else I can do for you? no      Thank you, and have a great day.

## 2022-08-29 ENCOUNTER — TELEPHONE (OUTPATIENT)
Dept: ORTHOPEDIC SURGERY | Facility: CLINIC | Age: 57
End: 2022-08-29

## 2022-08-29 NOTE — TELEPHONE ENCOUNTER
Called patient to let her know that I tried calling radiology again and it just rang till it disconnected. She said she had received a call about scheduling a test and they left a voicemail. She is going to call that number tomorrow to see if it is about her MRI and I will keep trying until we find out. Verbalized understanding.

## 2022-08-29 NOTE — TELEPHONE ENCOUNTER
Patient called about her MRI. I told her the people I have spoken with said it shouldn't be a problem for her to get the MRI. Going to see if the radiologist will take a look at her chest xray, where she had the procedure, just to make sure it is good to go. I told her to give me until 4:30 and call me back to make sure I have spoken with radiology and everything is good to go. Verbalized understanding.

## 2022-09-06 PROBLEM — Z12.11 ENCOUNTER FOR SCREENING FOR MALIGNANT NEOPLASM OF COLON: Status: ACTIVE | Noted: 2022-09-06

## 2022-09-06 PROBLEM — K44.9 HIATAL HERNIA: Status: ACTIVE | Noted: 2022-09-06

## 2022-09-14 ENCOUNTER — ANESTHESIA EVENT (OUTPATIENT)
Dept: PERIOP | Facility: HOSPITAL | Age: 57
End: 2022-09-14

## 2022-09-14 ENCOUNTER — ANESTHESIA (OUTPATIENT)
Dept: PERIOP | Facility: HOSPITAL | Age: 57
End: 2022-09-14

## 2022-09-14 ENCOUNTER — HOSPITAL ENCOUNTER (OUTPATIENT)
Facility: HOSPITAL | Age: 57
Setting detail: HOSPITAL OUTPATIENT SURGERY
Discharge: HOME | End: 2022-09-14
Attending: INTERNAL MEDICINE | Admitting: INTERNAL MEDICINE

## 2022-09-14 VITALS
TEMPERATURE: 98.1 F | HEART RATE: 78 BPM | OXYGEN SATURATION: 100 % | DIASTOLIC BLOOD PRESSURE: 65 MMHG | RESPIRATION RATE: 20 BRPM | SYSTOLIC BLOOD PRESSURE: 110 MMHG | BODY MASS INDEX: 28.73 KG/M2 | WEIGHT: 194 LBS | HEIGHT: 69 IN

## 2022-09-14 DIAGNOSIS — K44.9 HIATAL HERNIA: ICD-10-CM

## 2022-09-14 DIAGNOSIS — Z12.11 ENCOUNTER FOR SCREENING FOR MALIGNANT NEOPLASM OF COLON: ICD-10-CM

## 2022-09-14 DIAGNOSIS — K59.04 CHRONIC IDIOPATHIC CONSTIPATION: Primary | ICD-10-CM

## 2022-09-14 PROCEDURE — 25010000002 PROPOFOL 10 MG/ML EMULSION: Performed by: NURSE ANESTHETIST, CERTIFIED REGISTERED

## 2022-09-14 PROCEDURE — 25010000002 PHENYLEPHRINE 10 MG/ML SOLUTION: Performed by: NURSE ANESTHETIST, CERTIFIED REGISTERED

## 2022-09-14 PROCEDURE — 45378 DIAGNOSTIC COLONOSCOPY: CPT | Performed by: INTERNAL MEDICINE

## 2022-09-14 PROCEDURE — 25010000002 MIDAZOLAM PER 1 MG: Performed by: ANESTHESIOLOGY

## 2022-09-14 PROCEDURE — 25010000002 FENTANYL CITRATE (PF) 50 MCG/ML SOLUTION: Performed by: NURSE ANESTHETIST, CERTIFIED REGISTERED

## 2022-09-14 PROCEDURE — 43239 EGD BIOPSY SINGLE/MULTIPLE: CPT | Performed by: INTERNAL MEDICINE

## 2022-09-14 RX ORDER — SODIUM CHLORIDE 0.9 % (FLUSH) 0.9 %
10 SYRINGE (ML) INJECTION AS NEEDED
Status: DISCONTINUED | OUTPATIENT
Start: 2022-09-14 | End: 2022-09-14 | Stop reason: HOSPADM

## 2022-09-14 RX ORDER — MIDAZOLAM HYDROCHLORIDE 1 MG/ML
1 INJECTION INTRAMUSCULAR; INTRAVENOUS
Status: DISCONTINUED | OUTPATIENT
Start: 2022-09-14 | End: 2022-09-14 | Stop reason: HOSPADM

## 2022-09-14 RX ORDER — MEPERIDINE HYDROCHLORIDE 25 MG/ML
12.5 INJECTION INTRAMUSCULAR; INTRAVENOUS; SUBCUTANEOUS
Status: DISCONTINUED | OUTPATIENT
Start: 2022-09-14 | End: 2022-09-14 | Stop reason: HOSPADM

## 2022-09-14 RX ORDER — IPRATROPIUM BROMIDE AND ALBUTEROL SULFATE 2.5; .5 MG/3ML; MG/3ML
3 SOLUTION RESPIRATORY (INHALATION) ONCE AS NEEDED
Status: DISCONTINUED | OUTPATIENT
Start: 2022-09-14 | End: 2022-09-14 | Stop reason: HOSPADM

## 2022-09-14 RX ORDER — SODIUM CHLORIDE, SODIUM LACTATE, POTASSIUM CHLORIDE, CALCIUM CHLORIDE 600; 310; 30; 20 MG/100ML; MG/100ML; MG/100ML; MG/100ML
125 INJECTION, SOLUTION INTRAVENOUS ONCE
Status: COMPLETED | OUTPATIENT
Start: 2022-09-14 | End: 2022-09-14

## 2022-09-14 RX ORDER — PROPOFOL 10 MG/ML
VIAL (ML) INTRAVENOUS AS NEEDED
Status: DISCONTINUED | OUTPATIENT
Start: 2022-09-14 | End: 2022-09-14 | Stop reason: SURG

## 2022-09-14 RX ORDER — OXYCODONE HYDROCHLORIDE AND ACETAMINOPHEN 5; 325 MG/1; MG/1
1 TABLET ORAL ONCE AS NEEDED
Status: DISCONTINUED | OUTPATIENT
Start: 2022-09-14 | End: 2022-09-14 | Stop reason: HOSPADM

## 2022-09-14 RX ORDER — FENTANYL CITRATE 50 UG/ML
50 INJECTION, SOLUTION INTRAMUSCULAR; INTRAVENOUS
Status: DISCONTINUED | OUTPATIENT
Start: 2022-09-14 | End: 2022-09-14 | Stop reason: HOSPADM

## 2022-09-14 RX ORDER — PHENYLEPHRINE HYDROCHLORIDE 10 MG/ML
INJECTION INTRAVENOUS AS NEEDED
Status: DISCONTINUED | OUTPATIENT
Start: 2022-09-14 | End: 2022-09-14 | Stop reason: SURG

## 2022-09-14 RX ORDER — SODIUM CHLORIDE 0.9 % (FLUSH) 0.9 %
10 SYRINGE (ML) INJECTION EVERY 12 HOURS SCHEDULED
Status: DISCONTINUED | OUTPATIENT
Start: 2022-09-14 | End: 2022-09-14 | Stop reason: HOSPADM

## 2022-09-14 RX ORDER — FENTANYL CITRATE 50 UG/ML
INJECTION, SOLUTION INTRAMUSCULAR; INTRAVENOUS AS NEEDED
Status: DISCONTINUED | OUTPATIENT
Start: 2022-09-14 | End: 2022-09-14 | Stop reason: SURG

## 2022-09-14 RX ORDER — SODIUM CHLORIDE, SODIUM LACTATE, POTASSIUM CHLORIDE, CALCIUM CHLORIDE 600; 310; 30; 20 MG/100ML; MG/100ML; MG/100ML; MG/100ML
100 INJECTION, SOLUTION INTRAVENOUS ONCE AS NEEDED
Status: DISCONTINUED | OUTPATIENT
Start: 2022-09-14 | End: 2022-09-14 | Stop reason: HOSPADM

## 2022-09-14 RX ORDER — ONDANSETRON 2 MG/ML
4 INJECTION INTRAMUSCULAR; INTRAVENOUS AS NEEDED
Status: DISCONTINUED | OUTPATIENT
Start: 2022-09-14 | End: 2022-09-14 | Stop reason: HOSPADM

## 2022-09-14 RX ORDER — KETOROLAC TROMETHAMINE 30 MG/ML
30 INJECTION, SOLUTION INTRAMUSCULAR; INTRAVENOUS EVERY 6 HOURS PRN
Status: DISCONTINUED | OUTPATIENT
Start: 2022-09-14 | End: 2022-09-14 | Stop reason: HOSPADM

## 2022-09-14 RX ADMIN — PHENYLEPHRINE HYDROCHLORIDE 100 MCG: 10 INJECTION INTRAVENOUS at 12:21

## 2022-09-14 RX ADMIN — PROPOFOL 140 MCG/KG/MIN: 10 INJECTION, EMULSION INTRAVENOUS at 11:52

## 2022-09-14 RX ADMIN — FENTANYL CITRATE 100 MCG: 50 INJECTION INTRAMUSCULAR; INTRAVENOUS at 11:50

## 2022-09-14 RX ADMIN — SODIUM CHLORIDE, POTASSIUM CHLORIDE, SODIUM LACTATE AND CALCIUM CHLORIDE: 600; 310; 30; 20 INJECTION, SOLUTION INTRAVENOUS at 11:50

## 2022-09-14 RX ADMIN — PROPOFOL 50 MG: 10 INJECTION, EMULSION INTRAVENOUS at 11:52

## 2022-09-14 RX ADMIN — PHENYLEPHRINE HYDROCHLORIDE 100 MCG: 10 INJECTION INTRAVENOUS at 11:57

## 2022-09-14 RX ADMIN — MIDAZOLAM HYDROCHLORIDE 2 MG: 1 INJECTION, SOLUTION INTRAMUSCULAR; INTRAVENOUS at 11:50

## 2022-09-14 RX ADMIN — PHENYLEPHRINE HYDROCHLORIDE 100 MCG: 10 INJECTION INTRAVENOUS at 12:10

## 2022-09-14 NOTE — ANESTHESIA POSTPROCEDURE EVALUATION
Patient: Darlene Gaviria    Procedure Summary     Date: 09/14/22 Room / Location:  [facility]  /  [facility]    Anesthesia Start: 1150 Anesthesia Stop: 1228    Procedures:       ESOPHAGOGASTRODUODENOSCOPY WITH BIOPSY (N/A Esophagus)      COLONOSCOPY FOR SCREENING (N/A ) Diagnosis:       Encounter for screening for malignant neoplasm of colon      Hiatal hernia      (Encounter for screening for malignant neoplasm of colon [Z12.11])      (Hiatal hernia [K44.9])    Surgeons: Patricia Mohr MD Provider: Lionel Montanez DO    Anesthesia Type: general ASA Status: 2          Anesthesia Type: general    Vitals  Vitals Value Taken Time   /65 09/14/22 1258   Temp 98.1 °F (36.7 °C) 09/14/22 1228   Pulse 78 09/14/22 1258   Resp 20 09/14/22 1258   SpO2 100 % 09/14/22 1258           Post Anesthesia Care and Evaluation    Patient location during evaluation: PACU  Patient participation: complete - patient participated  Level of consciousness: responsive to verbal stimuli  Pain score: 0  Pain management: satisfactory to patient    Airway patency: patent  Anesthetic complications: No anesthetic complications  PONV Status: none  Cardiovascular status: hemodynamically stable  Respiratory status: nasal cannula  Hydration status: acceptable

## 2022-09-14 NOTE — H&P
[facility]    Patient Identification:  Name:  Darlene Gaviria  Age:  57 y.o.  Sex:  female  :  1965  MRN:  [id number]   Visit Number:  [id number]  Primary Care Physician:  Noemi Schreiber APRN       Chief complaint: Epigastric abdominal pain, hiatal hernia, constipation    History of presenting illness:  57 y.o. female presents today for EGD colonoscopy secondary to hiatal hernia, epigastric pain and constipation.  She recently underwent CT scan which revealed a large hiatal hernia.  She complains of pressure in the epigastric region as well as reflux.  She has been having issues with constipation.  She will complain of going at least 1 week without having a bowel movement.  She has started Amitiza with some relief but not good relief.  There is no family history of colon cancer.  The patient denies bright red blood per rectum or melena.  ---------------------------------------------------------------------------------------------------------------------   Review of Systems   Constitutional: Negative for activity change, appetite change, chills, fatigue, fever and unexpected weight change.   HENT: Positive for trouble swallowing. Negative for mouth sores.    Eyes: Negative for photophobia, pain and redness.   Respiratory: Negative for cough and choking.    Cardiovascular: Negative for chest pain and leg swelling.   Gastrointestinal: Positive for abdominal distention, abdominal pain and constipation. Negative for anal bleeding, diarrhea, nausea, rectal pain and vomiting.   Endocrine: Negative for cold intolerance, heat intolerance and polyphagia.   Genitourinary: Negative for difficulty urinating and dysuria.   Musculoskeletal: Negative for arthralgias, joint swelling and myalgias.   Skin: Negative for rash and wound.   Allergic/Immunologic: Negative for environmental allergies and food allergies.   Neurological: Negative for dizziness and  light-headedness.   Hematological: Negative for adenopathy. Does not bruise/bleed easily.   Psychiatric/Behavioral: Negative for sleep disturbance. The patient is not nervous/anxious.       ---------------------------------------------------------------------------------------------------------------------   Past Medical History:   Diagnosis Date   • Arthritis    • Asthma    • GERD (gastroesophageal reflux disease)    • Gout    • History of shingles     frequently has shingles   • Hx of hysterectomy    • Hypertension    • Non Hodgkin's lymphoma (HCC)     surgery to remove cancer, part of left lung @ [place], [place]   • Osteoarthritis    • Osteoporosis      Past Surgical History:   Procedure Laterality Date   • ABDOMINAL SURGERY     • APPENDECTOMY     • BREAST BIOPSY     • CARDIAC SURGERY      CARDIACTHORACIC SURGERY   •  SECTION     • CHEST SURGERY      thymus gland-left lung-vein removal left arm   • COLONOSCOPY     • ENDOSCOPY     • GALLBLADDER SURGERY     • HYSTERECTOMY     • LARYNGOPLASTY Left 10/02/2019    Procedure: LEFT VOCAL CORD MEDIALIZATION , FLEX BRONCHOSCOPY ( 1.5 HOURS WILL NEED ECKERT MEDIALIZATION TRAY WITH 23 HOURS OBS );  Surgeon: Reginald Arguelles MD;  Location: [facility];  Service: ENT   • OTHER SURGICAL HISTORY      Diaphragm and left vocal cord paralysed.   • OTHER SURGICAL HISTORY      left vocal cord medialization      Family History   Problem Relation Age of Onset   • Osteoporosis Mother    • Hypertension Mother    • Hypothyroidism Mother    • Hypertension Father    • Diabetes Father    • Heart disease Father    • Hypothyroidism Father    • Rheum arthritis Father    • Diabetes Brother    • Osteoporosis Maternal Grandmother    • Heart disease Maternal Grandmother    • Hypertension Maternal Grandmother    • Heart disease Maternal Grandfather    • Diabetes Maternal Grandfather    • Hypertension Maternal Grandfather    • Heart disease Paternal Grandmother    • Hypertension  Paternal Grandmother    • Heart disease Paternal Grandfather    • Hypertension Paternal Grandfather      Social History     Socioeconomic History   • Marital status:    Tobacco Use   • Smoking status: Never Smoker   • Smokeless tobacco: Never Used   Vaping Use   • Vaping Use: Never used   Substance and Sexual Activity   • Alcohol use: No     Comment: former use   • Drug use: No   • Sexual activity: Defer     ---------------------------------------------------------------------------------------------------------------------   Allergies:  Metoprolol  ---------------------------------------------------------------------------------------------------------------------   Prior to Admission Medications     Prescriptions Last Dose Informant Patient Reported? Taking?    amLODIPine (NORVASC) 5 MG tablet 9/14/2022 Self Yes Yes    Take 5 mg by mouth Daily.    calcium carbonate (OS-CORTNEY) 600 MG tablet 9/13/2022  Yes Yes    Take 600 mg by mouth 2 (Two) Times a Day With Meals.    carvedilol (COREG) 6.25 MG tablet 9/14/2022 Self Yes Yes    Take 6.25 mg by mouth 2 (Two) Times a Day With Meals.    Cholecalciferol (VITAMIN D3 PO) 9/13/2022  Yes Yes    Take  by mouth Daily.    esomeprazole (nexIUM) 40 MG capsule 9/13/2022  Yes Yes    Take 40 mg by mouth 2 (Two) Times a Day.    hydroCHLOROthiazide (HYDRODIURIL) 12.5 MG tablet 9/13/2022  Yes Yes    Take 12.5 mg by mouth Daily.    loratadine (CLARITIN) 10 MG tablet 9/13/2022 Self Yes Yes    Take 10 mg by mouth Daily.    pregabalin (LYRICA) 75 MG capsule 9/13/2022 Self Yes Yes    Take 75 mg by mouth Daily.    ProAir  (90 Base) MCG/ACT inhaler More than a month  Yes No    Vitamin D, Cholecalciferol, 25 MCG (1000 UT) tablet 9/13/2022  Yes Yes        Hospital Scheduled Meds:  lactated ringers, 125 mL/hr, Intravenous, Once  sodium chloride, 10 mL, Intravenous, Q12H          ---------------------------------------------------------------------------------------------------------------------   Vital Signs:  Temp:  [97.9 °F (36.6 °C)] 97.9 °F (36.6 °C)  Heart Rate:  [71] 71  Resp:  [18] 18  BP: (116)/(78) 116/78      09/14/22  0954   Weight: 88 kg (194 lb)     Body mass index is 28.65 kg/m².  ---------------------------------------------------------------------------------------------------------------------   Physical Exam:  Constitutional:  Well-developed and well-nourished.  No respiratory distress.      HENT:  Head: Normocephalic and atraumatic.  Mouth:  Moist mucous membranes.    Eyes:  Conjunctivae and EOM are normal.  Pupils are equal, round, and reactive to light.  No scleral icterus.  Neck:  Neck supple.  No JVD present.    Cardiovascular:  Normal rate, regular rhythm and normal heart sounds with no murmur.  Pulmonary/Chest:  No respiratory distress, no wheezes, no crackles, with normal breath sounds and good air movement.  Abdominal:  Soft.  Bowel sounds are normal.  No distension and no tenderness.   Musculoskeletal:  No edema, no tenderness, and no deformity.  No red or swollen joints anywhere.    Neurological:  Alert and oriented to person, place, and time.  No cranial nerve deficit.  No tongue deviation.  No facial droop.  No slurred speech.   Skin:  Skin is warm and dry.  No rash noted.  No pallor.   Psychiatric:  Normal mood and affect.  Behavior is normal.  Judgment and thought content normal.   Peripheral vascular:  No edema and strong pulses on all 4 extremities.  Genitourinary:  ---------------------------------------------------------------------------------------------------------------------  CrCl cannot be calculated (Patient's most recent lab result is older than the maximum 30 days allowed.).  No results found for: AMMONIA          No results found for: HGBA1C  No results found for: TSH, FREET4  No results found for: PREGTESTUR, PREGSERUM, HCG, HCGQUANT  Pain  Management Panel    There is no flowsheet data to display.       No results found for: BLOODCX  No results found for: URINECX  No results found for: WOUNDCX  No results found for: STOOLCX      ---------------------------------------------------------------------------------------------------------------------  Imaging Results (Last 7 Days)     ** No results found for the last 168 hours. **          I have personally reviewed the radiology images and read the final radiology report.  ---------------------------------------------------------------------------------------------------------------------  Assessment and Plan:  Proceed with EGD and colonoscopy.    Patricia Mohr MD  09/14/22  10:23 EDT

## 2022-09-14 NOTE — ANESTHESIA PREPROCEDURE EVALUATION
Anesthesia Evaluation     Patient summary reviewed and Nursing notes reviewed   no history of anesthetic complications:  NPO Solid Status: > 8 hours  NPO Liquid Status: > 8 hours           Airway   Mallampati: I  TM distance: >3 FB  Neck ROM: full  No difficulty expected  Dental - normal exam     Pulmonary - normal exam    breath sounds clear to auscultation  (+) asthma,  Cardiovascular - normal exam    ECG reviewed  Patient on routine beta blocker and Beta blocker given within 24 hours of surgery  Rhythm: regular  Rate: normal    (+) hypertension,       Neuro/Psych- negative ROS  GI/Hepatic/Renal/Endo    (+)  hiatal hernia, GERD,      Musculoskeletal     Abdominal  - normal exam   Substance History - negative use     OB/GYN negative ob/gyn ROS         Other   arthritis,    history of cancer (Non-Hodgkin's Lymphoma) remission    ROS/Med Hx Other: Echo 4/6/2022:  ·The study is technically difficult for diagnosis.  ·Left ventricular ejection fraction appears to be 56 - 60%. Left ventricular systolic function is normal.  ·The cardiac valves are structurally and functionally within normal limits.                       Anesthesia Plan    ASA 2     general   total IV anesthesia  intravenous induction     Anesthetic plan, risks, benefits, and alternatives have been provided, discussed and informed consent has been obtained with: patient and spouse/significant other.  Pre-procedure education provided  Plan discussed with CRNA.        CODE STATUS:

## 2022-09-16 ENCOUNTER — HOSPITAL ENCOUNTER (OUTPATIENT)
Dept: GENERAL RADIOLOGY | Facility: HOSPITAL | Age: 57
Discharge: HOME | End: 2022-09-16
Admitting: INTERNAL MEDICINE

## 2022-09-16 DIAGNOSIS — K59.04 CHRONIC IDIOPATHIC CONSTIPATION: ICD-10-CM

## 2022-09-16 LAB — REF LAB TEST METHOD: NORMAL

## 2022-09-16 PROCEDURE — 74246 X-RAY XM UPR GI TRC 2CNTRST: CPT | Performed by: RADIOLOGY

## 2022-09-16 PROCEDURE — 74246 X-RAY XM UPR GI TRC 2CNTRST: CPT

## 2022-09-16 NOTE — PROGRESS NOTES
A recent upper GI series does not reveal a large hiatal hernia but does reveal elevation of the left hemidiaphragm.  I am not sure as to the cause of this.  You do have a small hiatal hernia with reflux to the mid esophagus.  There is also esophageal dysmotility with irregular movement of the esophagus.  Please keep your follow-up appointment.

## 2022-09-19 NOTE — PROGRESS NOTES
At the time of your recent upper endoscopy, biopsies of the esophagus were negative.  Biopsies of the small bowel were negative for H. pylori.  Please continue Nexium.  Your colonoscopy was normal.  You will need repeat colonoscopy in 10 years for screening purposes.  Please keep your follow-up appointment and continue your current medications.

## 2022-09-20 ENCOUNTER — HOSPITAL ENCOUNTER (OUTPATIENT)
Dept: MRI IMAGING | Facility: HOSPITAL | Age: 57
Discharge: HOME | End: 2022-09-20
Admitting: FAMILY MEDICINE

## 2022-09-20 DIAGNOSIS — M79.672 CHRONIC PAIN IN LEFT FOOT: ICD-10-CM

## 2022-09-20 DIAGNOSIS — M77.31 CALCANEAL SPUR OF BOTH FEET: ICD-10-CM

## 2022-09-20 DIAGNOSIS — M77.32 CALCANEAL SPUR OF BOTH FEET: ICD-10-CM

## 2022-09-20 DIAGNOSIS — M72.2 PLANTAR FASCIITIS OF LEFT FOOT: ICD-10-CM

## 2022-09-20 DIAGNOSIS — M77.52 LEFT CALCANEAL BURSITIS: ICD-10-CM

## 2022-09-20 DIAGNOSIS — G89.29 CHRONIC PAIN IN LEFT FOOT: ICD-10-CM

## 2022-09-20 DIAGNOSIS — M25.571 BILATERAL ANKLE PAIN, UNSPECIFIED CHRONICITY: ICD-10-CM

## 2022-09-20 DIAGNOSIS — M25.572 BILATERAL ANKLE PAIN, UNSPECIFIED CHRONICITY: ICD-10-CM

## 2022-09-20 DIAGNOSIS — M76.62 TENDONITIS, ACHILLES, LEFT: ICD-10-CM

## 2022-09-20 PROCEDURE — 73721 MRI JNT OF LWR EXTRE W/O DYE: CPT | Performed by: RADIOLOGY

## 2022-09-20 PROCEDURE — 73721 MRI JNT OF LWR EXTRE W/O DYE: CPT

## 2022-10-24 ENCOUNTER — OFFICE VISIT (OUTPATIENT)
Dept: GASTROENTEROLOGY | Facility: CLINIC | Age: 57
End: 2022-10-24

## 2022-10-24 VITALS — BODY MASS INDEX: 30.46 KG/M2 | WEIGHT: 201 LBS | HEIGHT: 68 IN

## 2022-10-24 DIAGNOSIS — K59.04 CHRONIC IDIOPATHIC CONSTIPATION: Primary | ICD-10-CM

## 2022-10-24 DIAGNOSIS — K21.9 GASTROESOPHAGEAL REFLUX DISEASE WITHOUT ESOPHAGITIS: ICD-10-CM

## 2022-10-24 PROCEDURE — 99213 OFFICE O/P EST LOW 20 MIN: CPT | Performed by: PHYSICIAN ASSISTANT

## 2022-10-24 RX ORDER — DEXLANSOPRAZOLE 60 MG/1
60 CAPSULE, DELAYED RELEASE ORAL
Qty: 90 CAPSULE | Refills: 3 | Status: SHIPPED | OUTPATIENT
Start: 2022-10-24

## 2022-10-24 NOTE — PROGRESS NOTES
Chief Complaint   Patient presents with   • Abdominal Pain   • Nausea       Darlene Gaviria is a 57 y.o. female who presents to the office today for evaluation of Abdominal Pain and Nausea  .    HPI     Darlene Gaviria is a 57-year-old female who presents today for follow-up for abdominal pain and nausea. On 09/14/2022 she underwent an EGD colonoscopy with Dr. To. EGD results revealed normal esophagus, hiatal hernia, patchy mild erythematous mucosa found in the gastric antrum and the duodenum was normal. Dr. To recommends upper GI series. Colonoscopy wise, results revealed normal perianal and digital rectal exams. Colon appeared normal internal hemorrhoids that were grade 1 on retroflexion, normal terminal ileum as well. Repeat screening colonoscopy for at least 10 years. At her last clinic visit she was having alternating constipation and diarrhea.    The patient reports that her nausea is about the same. The patient states that she has been taking Trulance as prescribed by Dr. To. She states that the constipation is not bothering her now. She states she gets a little diarrhea, but it is not bothersome. The patient believes that it is a side effect of Trulance. She states that she will have a solid bowel movement once each week, and the rest of her bowel movements are water. She adds that she does not feel empty when she goes. She states that she feels like she needs to go more all the time. The patient states that she does not remember having any trouble with Linzess. She denies having trouble swallowing. She states that she still has chest pains about once a week. The patient states that it usually is after she has had something spicy, if she drinks coffee late at night, or sometimes it will happen out of the blue. She does not know if she has taken Dexilant in the past.     The patient states that she knows Dr. To said she did not have to have her hernia removed, but she would like a  "second opinion. 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 She states that she understands that a hiatal hernia might not usually be removed, but in her case, she believes it would be beneficial. She states that she is tired of being sick. The patient states she would like to have it done after Suhail.    The patient reports that her whole family has IBS. She states that her grandfather and an uncle had chronic bowel issues. She states that Dr. Marrero told her that her colon does not push like it should. The patient states that she sometimes pushes her perineum to help stool come out. She states that Dr. Marrero had her stop MiraLAX because it was \"gumming up\" her colon.       Review of Systems   Constitutional: Negative for fever.   HENT: Positive for trouble swallowing.    Eyes: Negative.    Respiratory: Negative.    Cardiovascular: Negative.    Gastrointestinal: Positive for abdominal distention, abdominal pain and diarrhea. Negative for anal bleeding, blood in stool, constipation, nausea, rectal pain and vomiting.   Endocrine: Negative.    Genitourinary: Negative.    Musculoskeletal: Negative.    Skin: Negative.    Allergic/Immunologic: Negative.    Neurological: Negative.    Hematological: Negative.    Psychiatric/Behavioral: Negative.        ACTIVE PROBLEMS:   Specialty Problems        Gastroenterology Problems    Dysphagia, pharyngoesophageal phase           PAST MEDICAL HISTORY:  Past Medical History:   Diagnosis Date   • Arthritis    • Asthma    • GERD (gastroesophageal reflux disease)    • Gout    • History of shingles     frequently has shingles   • Hx of hysterectomy    • Hypertension    • Non Hodgkin's lymphoma (HCC) 2008    surgery to remove cancer, part of left lung @ [place], [place]   • Osteoarthritis    • Osteoporosis        SURGICAL HISTORY:  Past Surgical " History:   Procedure Laterality Date   • ABDOMINAL SURGERY     • APPENDECTOMY     • BREAST BIOPSY     • CARDIAC SURGERY      CARDIACTHORACIC SURGERY   •  SECTION     • CHEST SURGERY      thymus gland-left lung-vein removal left arm   • COLONOSCOPY     • COLONOSCOPY N/A 2022    Procedure: COLONOSCOPY FOR SCREENING;  Surgeon: Patricia Mohr MD;  Location: [facility];  Service: Gastroenterology;  Laterality: N/A;   • ENDOSCOPY     • ENDOSCOPY N/A 2022    Procedure: ESOPHAGOGASTRODUODENOSCOPY WITH BIOPSY;  Surgeon: Patricia Mohr MD;  Location: [facility];  Service: Gastroenterology;  Laterality: N/A;   • GALLBLADDER SURGERY     • HYSTERECTOMY     • LARYNGOPLASTY Left 10/02/2019    Procedure: LEFT VOCAL CORD MEDIALIZATION , FLEX BRONCHOSCOPY ( 1.5 HOURS WILL NEED ECKERT MEDIALIZATION TRAY WITH 23 HOURS OBS );  Surgeon: Reginald Arguelles MD;  Location: [facility];  Service: ENT   • OTHER SURGICAL HISTORY      Diaphragm and left vocal cord paralysed.   • OTHER SURGICAL HISTORY      left vocal cord medialization        FAMILY HISTORY:  Family History   Problem Relation Age of Onset   • Osteoporosis Mother    • Hypertension Mother    • Hypothyroidism Mother    • Hypertension Father    • Diabetes Father    • Heart disease Father    • Hypothyroidism Father    • Rheum arthritis Father    • Diabetes Brother    • Osteoporosis Maternal Grandmother    • Heart disease Maternal Grandmother    • Hypertension Maternal Grandmother    • Heart disease Maternal Grandfather    • Diabetes Maternal Grandfather    • Hypertension Maternal Grandfather    • Heart disease Paternal Grandmother    • Hypertension Paternal Grandmother    • Heart disease Paternal Grandfather    • Hypertension Paternal Grandfather        SOCIAL HISTORY:  Social History     Tobacco Use   • Smoking status: Never   • Smokeless tobacco: Never   Substance Use Topics   • Alcohol use: No     Comment: former use       CURRENT  "MEDICATION:    Current Outpatient Medications:   •  amLODIPine (NORVASC) 5 MG tablet, Take 5 mg by mouth Daily., Disp: , Rfl:   •  calcium carbonate (OS-CORTNEY) 600 MG tablet, Take 600 mg by mouth 2 (Two) Times a Day With Meals., Disp: , Rfl:   •  carvedilol (COREG) 6.25 MG tablet, Take 6.25 mg by mouth 2 (Two) Times a Day With Meals., Disp: , Rfl:   •  Cholecalciferol (VITAMIN D3 PO), Take  by mouth Daily., Disp: , Rfl:   •  hydroCHLOROthiazide (HYDRODIURIL) 12.5 MG tablet, Take 12.5 mg by mouth Daily., Disp: , Rfl:   •  loratadine (CLARITIN) 10 MG tablet, Take 10 mg by mouth Daily., Disp: , Rfl:   •  pregabalin (LYRICA) 75 MG capsule, Take 75 mg by mouth Daily., Disp: , Rfl:   •  ProAir  (90 Base) MCG/ACT inhaler, , Disp: , Rfl:   •  Vitamin D, Cholecalciferol, 25 MCG (1000 UT) tablet, , Disp: , Rfl:   •  dexlansoprazole (DEXILANT) 60 MG capsule, Take 1 capsule by mouth Every Morning Before Breakfast., Disp: 90 capsule, Rfl: 3  •  linaclotide (LINZESS) 72 MCG capsule capsule, Take 1 capsule by mouth Every Morning Before Breakfast., Disp: 30 capsule, Rfl: 11    ALLERGIES:  Metoprolol    VISIT VITALS:  Ht 172.7 cm (68\")   Wt 91.2 kg (201 lb)   BMI 30.56 kg/m²   Physical Exam  Constitutional:       General: She is not in acute distress.     Appearance: Normal appearance. She is well-developed.   HENT:      Head: Normocephalic and atraumatic.   Eyes:      Pupils: Pupils are equal, round, and reactive to light.   Cardiovascular:      Rate and Rhythm: Normal rate and regular rhythm.      Heart sounds: Normal heart sounds.   Pulmonary:      Effort: Pulmonary effort is normal. No respiratory distress.      Breath sounds: Normal breath sounds. No wheezing, rhonchi or rales.   Abdominal:      General: Abdomen is flat. Bowel sounds are normal. There is no distension.      Palpations: Abdomen is soft. There is no mass.      Tenderness: There is no abdominal tenderness. There is no guarding or rebound.      Hernia: No " hernia is present.   Musculoskeletal:         General: No swelling. Normal range of motion.      Cervical back: Normal range of motion and neck supple.      Right lower leg: No edema.      Left lower leg: No edema.   Skin:     General: Skin is warm and dry.   Neurological:      Mental Status: She is alert and oriented to person, place, and time.   Psychiatric:         Attention and Perception: Attention normal.         Mood and Affect: Mood normal.         Speech: Speech normal.         Behavior: Behavior normal. Behavior is cooperative.         Thought Content: Thought content normal.     09/14/2022 EGD colonoscopy results were reviewed with the patient.     Assessment       Diagnosis Plan   1. Chronic idiopathic constipation  linaclotide (LINZESS) 72 MCG capsule capsule      2. Gastroesophageal reflux disease without esophagitis  dexlansoprazole (DEXILANT) 60 MG capsule      1. GERD  She will start Dexilant once insurance approves it. She will continue Nexium until then.     2. Chronic idiopathic constipation  The patient was given Linzess samples to try. She was advised to let me know if after 2 weeks it is still causing her diarrhea. We may change to Motegrity.    Return in about 3 months (around 1/24/2023).        Return for follow-up in 01/2023.    Transcribed from ambient dictation for Uma Thompson PA-C by Leeann Vail.  10/24/22   16:20 EDT    Patient or patient representative verbalized consent to the visit recording.  I have personally performed the services described in this document as transcribed by the above individual, and it is both accurate and complete.                  This document has been electronically signed by Uma Thompson PA-C  October 27, 2022 12:35 EDT    Part of this note may be an electronic transcription/translation of spoken language to printed text using the Dragon Dictation System.

## 2022-11-03 ENCOUNTER — OFFICE VISIT (OUTPATIENT)
Dept: ORTHOPEDIC SURGERY | Facility: CLINIC | Age: 57
End: 2022-11-03

## 2022-11-03 VITALS
HEART RATE: 81 BPM | DIASTOLIC BLOOD PRESSURE: 75 MMHG | HEIGHT: 68 IN | OXYGEN SATURATION: 97 % | SYSTOLIC BLOOD PRESSURE: 109 MMHG | WEIGHT: 201 LBS | BODY MASS INDEX: 30.46 KG/M2

## 2022-11-03 DIAGNOSIS — M77.52 LEFT CALCANEAL BURSITIS: ICD-10-CM

## 2022-11-03 DIAGNOSIS — M79.672 CHRONIC PAIN IN LEFT FOOT: ICD-10-CM

## 2022-11-03 DIAGNOSIS — M72.2 PLANTAR FASCIITIS OF LEFT FOOT: ICD-10-CM

## 2022-11-03 DIAGNOSIS — M77.31 CALCANEAL SPUR OF BOTH FEET: ICD-10-CM

## 2022-11-03 DIAGNOSIS — M76.62 TENDONITIS, ACHILLES, LEFT: Primary | ICD-10-CM

## 2022-11-03 DIAGNOSIS — M77.32 CALCANEAL SPUR OF BOTH FEET: ICD-10-CM

## 2022-11-03 DIAGNOSIS — G89.29 CHRONIC PAIN IN LEFT FOOT: ICD-10-CM

## 2022-11-03 PROCEDURE — 99214 OFFICE O/P EST MOD 30 MIN: CPT | Performed by: FAMILY MEDICINE

## 2022-11-03 RX ORDER — DICLOFENAC SODIUM 20 MG/G
1-2 SOLUTION TOPICAL EVERY 12 HOURS PRN
Qty: 112 G | Refills: 0 | COMMUNITY
Start: 2022-11-03 | End: 2023-02-16

## 2022-11-03 NOTE — PROGRESS NOTES
Follow Up Visit      Patient: Darlene Gaviria  YOB: 1965  Date of Encounter: 11/03/2022  PCP: Noemi Schreiber APRN  Referring Provider: No ref. provider found     Subjective   Darlene Gaviria is a 57 y.o. female who presents to the office today for evaluation of Pain and Follow-up of the Left Ankle and MRI Review      Chief Complaint   Patient presents with   • Left Ankle - Pain, Follow-up   • MRI Review       HPI  Patient presents for follow-up for left Achilles tendinitis and plantar fasciitis.  Had her MRI and needs to review results..  States that she did get to go to a few more visits of PT after our last visit and that they did iontophoresis on her Achilles tendon 1 time which was very helpful but insurance has stopped paying for the PT.  Samples of Pennsaid were extremely helpful and her foot and ankle pain was nearly gone while using it but this is again not covered by your insurance right now.  States she has been doing her home exercises approximately every other day but is still having pain in the bottom and back of the heel.  Patient Active Problem List   Diagnosis   • Mild intermittent asthma without complication   • Essential hypertension   • Chronic laryngitis   • Paralysis of larynx   • Dysphagia, pharyngoesophageal phase   • Status post surgery   • COVID-19   • Encounter for screening for malignant neoplasm of colon   • Hiatal hernia       Past Medical History:   Diagnosis Date   • Arthritis    • Asthma    • GERD (gastroesophageal reflux disease)    • Gout    • History of shingles     frequently has shingles   • Hx of hysterectomy    • Hypertension    • Non Hodgkin's lymphoma (HCC) 2008    surgery to remove cancer, part of left lung @ [place], [place]   • Osteoarthritis    • Osteoporosis        Allergies   Allergen Reactions   • Metoprolol Other (See Comments)     Leg weakness       Review of Systems   Constitutional: Positive for activity change. Negative for fever.  "  Respiratory: Negative for shortness of breath and wheezing.    Cardiovascular: Negative for chest pain.   Musculoskeletal: Positive for arthralgias, gait problem, joint swelling and myalgias.   Skin: Negative for color change and wound.   Neurological: Negative for weakness and numbness.       Visit Vitals  /75   Pulse 81   Ht 172.7 cm (68\")   Wt 91.2 kg (201 lb)   SpO2 97%   BMI 30.56 kg/m²     57 y.o.female  Physical Exam  Vitals and nursing note reviewed.   Constitutional:       General: She is not in acute distress.     Appearance: Normal appearance.   Pulmonary:      Effort: Pulmonary effort is normal. No respiratory distress.   Musculoskeletal:      Comments: Left lower extremity:  Mild soreness on the medial calcaneus at the plantar fascia insertion. No pain of heel on great toe dorsiflexion  Improved tenderness of Achilles tendon and retrocalcaneal bursa with swelling.  Mild Pain on passive dorsiflexion.  Negative talar tilt and anterior drawer test.     Skin:     General: Skin is warm and dry.      Findings: No erythema.   Neurological:      General: No focal deficit present.      Mental Status: She is alert.      Sensory: No sensory deficit.      Motor: No weakness.         Radiology Results:    MRI Ankle Left Without Contrast  Narrative: MRI ANKLE LEFT  WO CONTRAST-     REASON FOR EXAM:  chronic achilles tendonitis and retrocalcaneal  bursitis, and plantar fasciitis; M25.571-Pain in right ankle and joints  of right foot; M25.572-Pain in left ankle and joints of left foot;  M79.672-Pain in left foot; G89.29-Other chronic pain; M76.62-Achilles  tendinitis, left leg; M72.2-Plantar fascial fibromatosis;  M77.31-Calcaneal spur, right foot; M77.32-Calcaneal spur, left foot;  M77.52-Other ent     COMPARISON: None.     Scans were obtained through the ankle on the left side in the sagittal,  axial and coronal planes using T1 and T2-weighted scans.     MRI FINDINGS: The signal intensity in the bones around " the ankle show no  marrow replacement processes, occult fractures or bone contusions. There  is some very mild edema in the posterior calcaneus at the insertion of  the gastrocnemius tendon. The tendon is thickened with abnormal signal  consistent with tendinitis. It was not torn. Also noted is a heel spur  in the calcaneus at the insertion of the plantar fascia.     Impression: MRI findings consistent with changes of chronic plantar  fasciitis with heel spur. There is thickening of the gastrocnemius  tendon with abnormal signal consistent with tendinitis. There is also  mild edema in the calcaneus at the insertion of the gastrocnemius  tendon.     This report was finalized on 9/21/2022 8:01 AM by Dr. Scot Rordíguez II, MD.         Assessment & Plan   Diagnoses and all orders for this visit:    1. Tendonitis, Achilles, left (Primary)  -     Diclofenac Sodium (Pennsaid) 2 % solution; Apply 1-2 Pump topically Every 12 (Twelve) Hours As Needed (pain).  Dispense: 112 g; Refill: 0    2. Plantar fasciitis of left foot  -     Diclofenac Sodium (Pennsaid) 2 % solution; Apply 1-2 Pump topically Every 12 (Twelve) Hours As Needed (pain).  Dispense: 112 g; Refill: 0    3. Left calcaneal bursitis  -     Diclofenac Sodium (Pennsaid) 2 % solution; Apply 1-2 Pump topically Every 12 (Twelve) Hours As Needed (pain).  Dispense: 112 g; Refill: 0    4. Chronic pain in left foot  -     Diclofenac Sodium (Pennsaid) 2 % solution; Apply 1-2 Pump topically Every 12 (Twelve) Hours As Needed (pain).  Dispense: 112 g; Refill: 0    5. Calcaneal spur of both feet  -     Diclofenac Sodium (Pennsaid) 2 % solution; Apply 1-2 Pump topically Every 12 (Twelve) Hours As Needed (pain).  Dispense: 112 g; Refill: 0         MEDS ORDERED DURING VISIT:  No orders of the defined types were placed in this encounter.    MEDICATION ISSUES:  Discussed medication options and treatment plan of prescribed medication as well as the risks, benefits, and side effects  including potential falls, possible impaired driving and metabolic adversities among others. Patient is agreeable to call the office with any worsening of symptoms or onset of side effects. Patient is agreeable to call 911 or go to the nearest ER should he/she begin having SI/HI.     Discussion:  Reviewed MRI with patient and there is nothing surgical on it at this time.  Signs of chronic tendinitis and Planter fasciitis.  Patient did very well with Pennsaid samples but her insurance is not covering it at this time.  Patient cannot take any oral NSAIDs due to GI problems and Voltaren gel was not helpful.  I have given the patient a sample bottle of Pennsaid to use.  When that is gone may have to try again to get it covered by insurance since other options are not available.  Patient will be more diligent about doing her home exercises once to twice a day.  Follow-up in 1 month if not improving we will send the patient back to physical therapy.  Would also consider injections.  Patient was previously doing very well with physical therapy but her insurance stopped paying for it.  Follow-up in 1 month             This document has been electronically signed by Elvis Adams DO   November 3, 2022 16:38 EDT    Dictated Utilizing Dragon Dictation: Part of this note may be an electronic transcription/translation of spoken language to printed text using the Dragon Dictation System.

## 2023-01-20 ENCOUNTER — OFFICE VISIT (OUTPATIENT)
Dept: GASTROENTEROLOGY | Facility: CLINIC | Age: 58
End: 2023-01-20
Payer: COMMERCIAL

## 2023-01-20 VITALS — BODY MASS INDEX: 29.67 KG/M2 | WEIGHT: 195.8 LBS | HEIGHT: 68 IN

## 2023-01-20 DIAGNOSIS — K44.9 HIATAL HERNIA: ICD-10-CM

## 2023-01-20 DIAGNOSIS — K21.9 GASTROESOPHAGEAL REFLUX DISEASE WITHOUT ESOPHAGITIS: Primary | ICD-10-CM

## 2023-01-20 PROCEDURE — 99213 OFFICE O/P EST LOW 20 MIN: CPT | Performed by: PHYSICIAN ASSISTANT

## 2023-01-20 RX ORDER — FAMOTIDINE 40 MG/1
40 TABLET, FILM COATED ORAL 2 TIMES DAILY PRN
Qty: 60 TABLET | Refills: 0 | Status: SHIPPED | OUTPATIENT
Start: 2023-01-20 | End: 2023-02-19

## 2023-01-20 RX ORDER — SUCRALFATE 1 G/1
1 TABLET ORAL 4 TIMES DAILY
Qty: 120 TABLET | Refills: 0 | Status: SHIPPED | OUTPATIENT
Start: 2023-01-20

## 2023-01-20 NOTE — PROGRESS NOTES
Chief Complaint   Patient presents with   • Constipation       Darlene Gaviria is a 58 y.o. female who presents to the office today for evaluation of Constipation  .    HPI   The patient is here for a follow-up visit for constipation. She was last seen in clinic in 10/2022, in which she was placed on Linzess 72 mcg once daily and then Dexilant 60 mg once daily for GERD.    The patient reports that her symptoms had mostly resolved. She states that she was eating well and only had a few episodes of constipation she was able to resolve with stool softener. However, approximately 3 weeks ago she ate hot wings and has been vomiting everything she eats since. She reports that she has vomited 4 or 5 times within the last 3 weeks. The patient states that she feels bloated, has pain in her chest, and she cannot breathe. She reports that her acid reflux is worse than usual, and makes her throat sore. In the past the patient has effectviely managed her acid reflux with Dexilant. The patient states if she eats or drinks coffee late at night it will occur, but not as bad. In the past the patient would experience back pain with her reflux and those symptoms have returned in the past week. The patient states that she has not taken Carafate.    The patient reports that she used to see diarrhea unless she was constipated. The patient reports that she is still experiencing bowel urgency along with the constipation. She states that she has taken stool softeners twice in the last 2 weeks. The patient notes that the Linzess did help with her constipation. She reports that when she took the Linzess, it caused diarrhea, as well as bowel urgency and loose stools.     The patient states that she would like to see someone about her hernia. The patient states that her diaphragm is not working, and everything is  up in her chest, and it makes everything a lot worse. She notes that she cannot breathe, and she gets chest pains. The patient  states that the surgeon told her that it is smaller. She notes that she has nerve damage.    The patient states that she has problems swallowing pills. 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      Review of Systems   Constitutional: Negative for fever.   HENT: Negative for trouble swallowing.    Eyes: Negative.    Respiratory: Negative.    Cardiovascular: Negative.    Gastrointestinal: Positive for abdominal distention, abdominal pain, constipation, diarrhea, nausea and vomiting. Negative for anal bleeding, blood in stool and rectal pain.   Endocrine: Negative.    Genitourinary: Negative.    Musculoskeletal: Negative.    Skin: Negative.    Allergic/Immunologic: Negative.    Neurological: Negative.    Hematological: Negative.    Psychiatric/Behavioral: Negative.        ACTIVE PROBLEMS:   Specialty Problems        Gastroenterology Problems    Dysphagia, pharyngoesophageal phase           PAST MEDICAL HISTORY:  Past Medical History:   Diagnosis Date   • Arthritis    • Asthma    • GERD (gastroesophageal reflux disease)    • Gout    • History of shingles     frequently has shingles   • Hx of hysterectomy    • Hypertension    • Non Hodgkin's lymphoma (HCC) 2008    surgery to remove cancer, part of left lung @ [place], [place]   • Osteoarthritis    • Osteoporosis        SURGICAL HISTORY:  Past Surgical History:   Procedure Laterality Date   • ABDOMINAL SURGERY     • APPENDECTOMY     • BREAST BIOPSY     • CARDIAC SURGERY      CARDIACTHORACIC SURGERY   •  SECTION     • CHEST SURGERY      thymus gland-left lung-vein removal left arm   • COLONOSCOPY     • COLONOSCOPY N/A 2022    Procedure: COLONOSCOPY FOR SCREENING;  Surgeon: Patricia Mohr MD;  [facility];  Service: Gastroenterology;  Laterality: N/A;   • ENDOSCOPY     • ENDOSCOPY N/A 2022    Procedure: ESOPHAGOGASTRODUODENOSCOPY WITH BIOPSY;  Surgeon: Patricia Mohr MD;   Location:  [facility];  Service: Gastroenterology;  Laterality: N/A;   • GALLBLADDER SURGERY     • HYSTERECTOMY     • LARYNGOPLASTY Left 10/02/2019    Procedure: LEFT VOCAL CORD MEDIALIZATION , FLEX BRONCHOSCOPY ( 1.5 HOURS WILL NEED ECKERT MEDIALIZATION TRAY WITH 23 HOURS OBS );  Surgeon: Reginald Arguelles MD;  Location:  [facility];  Service: ENT   • OTHER SURGICAL HISTORY      Diaphragm and left vocal cord paralysed.   • OTHER SURGICAL HISTORY      left vocal cord medialization        FAMILY HISTORY:  Family History   Problem Relation Age of Onset   • Osteoporosis Mother    • Hypertension Mother    • Hypothyroidism Mother    • Hypertension Father    • Diabetes Father    • Heart disease Father    • Hypothyroidism Father    • Rheum arthritis Father    • Diabetes Brother    • Osteoporosis Maternal Grandmother    • Heart disease Maternal Grandmother    • Hypertension Maternal Grandmother    • Heart disease Maternal Grandfather    • Diabetes Maternal Grandfather    • Hypertension Maternal Grandfather    • Heart disease Paternal Grandmother    • Hypertension Paternal Grandmother    • Heart disease Paternal Grandfather    • Hypertension Paternal Grandfather        SOCIAL HISTORY:  Social History     Tobacco Use   • Smoking status: Never   • Smokeless tobacco: Never   Substance Use Topics   • Alcohol use: No     Comment: former use       CURRENT MEDICATION:    Current Outpatient Medications:   •  amLODIPine (NORVASC) 5 MG tablet, Take 5 mg by mouth Daily., Disp: , Rfl:   •  calcium carbonate (OS-CORTNEY) 600 MG tablet, Take 600 mg by mouth 2 (Two) Times a Day With Meals., Disp: , Rfl:   •  carvedilol (COREG) 6.25 MG tablet, Take 6.25 mg by mouth 2 (Two) Times a Day With Meals., Disp: , Rfl:   •  dexlansoprazole (DEXILANT) 60 MG capsule, Take 1 capsule by mouth Every Morning Before Breakfast., Disp: 90 capsule, Rfl: 3  •  Diclofenac Sodium (Pennsaid) 2 % solution, Apply 1-2 Pump topically Every 12 (Twelve) Hours As Needed  "(pain)., Disp: 112 g, Rfl: 0  •  hydroCHLOROthiazide (HYDRODIURIL) 12.5 MG tablet, Take 12.5 mg by mouth Daily., Disp: , Rfl:   •  linaclotide (LINZESS) 72 MCG capsule capsule, Take 1 capsule by mouth Every Morning Before Breakfast., Disp: 30 capsule, Rfl: 11  •  loratadine (CLARITIN) 10 MG tablet, Take 10 mg by mouth Daily., Disp: , Rfl:   •  pregabalin (LYRICA) 75 MG capsule, Take 75 mg by mouth Daily., Disp: , Rfl:   •  ProAir  (90 Base) MCG/ACT inhaler, , Disp: , Rfl:   •  Vitamin D, Cholecalciferol, 25 MCG (1000 UT) tablet, , Disp: , Rfl:   •  famotidine (PEPCID) 40 MG tablet, Take 1 tablet by mouth 2 (Two) Times a Day As Needed for Indigestion or Heartburn for up to 30 days., Disp: 60 tablet, Rfl: 0  •  sucralfate (Carafate) 1 g tablet, Take 1 tablet by mouth 4 (Four) Times a Day., Disp: 120 tablet, Rfl: 0    ALLERGIES:  Metoprolol    VISIT VITALS:  Ht 172.7 cm (68\")   Wt 88.8 kg (195 lb 12.8 oz)   BMI 29.77 kg/m²   Physical Exam  Constitutional:       General: She is not in acute distress.     Appearance: Normal appearance. She is well-developed.   HENT:      Head: Normocephalic and atraumatic.   Eyes:      Pupils: Pupils are equal, round, and reactive to light.   Cardiovascular:      Rate and Rhythm: Normal rate and regular rhythm.      Heart sounds: Normal heart sounds.   Pulmonary:      Effort: Pulmonary effort is normal. No respiratory distress.      Breath sounds: Normal breath sounds. No wheezing, rhonchi or rales.   Abdominal:      General: Abdomen is flat. Bowel sounds are normal. There is no distension.      Palpations: Abdomen is soft. There is no mass.      Tenderness: There is no abdominal tenderness. There is no guarding or rebound.      Hernia: No hernia is present.   Musculoskeletal:         General: No swelling. Normal range of motion.      Cervical back: Normal range of motion and neck supple.      Right lower leg: No edema.      Left lower leg: No edema.   Skin:     General: Skin is " warm and dry.   Neurological:      Mental Status: She is alert and oriented to person, place, and time.   Psychiatric:         Attention and Perception: Attention normal.         Mood and Affect: Mood normal.         Speech: Speech normal.         Behavior: Behavior normal. Behavior is cooperative.         Thought Content: Thought content normal.         Assessment       Diagnosis Plan   1. Gastroesophageal reflux disease without esophagitis  sucralfate (Carafate) 1 g tablet    famotidine (PEPCID) 40 MG tablet      2. Hiatal hernia  Ambulatory Referral to General Surgery      1. Constipation  - She will continue Linzess    2. GERD  - She will continue Dexilant.   - She will start Pepcid 40 mg as needed    3.  Abdominal Pain Unspecified  - She will start Carafate   - She will not eat or drink anything for 30 minutes after taking Carafate    4. Hernia  - Referral for general surgeon    Return if symptoms worsen or fail to improve.                   This document has been electronically signed by Uma Thompson PA-C  January 26, 2023 13:44 EST    Part of this note may be an electronic transcription/translation of spoken language to printed text using the Dragon Dictation System.    Transcribed from ambient dictation for Uma Thompson PA-C by Danya Fuentes    01/20/23   18:36 EST    Patient or patient representative verbalized consent to the visit recording.  I have personally performed the services described in this document as transcribed by the above individual, and it is both accurate and complete.

## 2023-01-30 ENCOUNTER — OFFICE VISIT (OUTPATIENT)
Dept: SURGERY | Facility: CLINIC | Age: 58
End: 2023-01-30
Payer: COMMERCIAL

## 2023-01-30 VITALS
HEIGHT: 68 IN | BODY MASS INDEX: 29.22 KG/M2 | HEART RATE: 85 BPM | WEIGHT: 192.8 LBS | DIASTOLIC BLOOD PRESSURE: 70 MMHG | SYSTOLIC BLOOD PRESSURE: 102 MMHG

## 2023-01-30 DIAGNOSIS — K44.9 HIATAL HERNIA: Primary | ICD-10-CM

## 2023-01-30 DIAGNOSIS — K21.9 GASTROESOPHAGEAL REFLUX DISEASE WITHOUT ESOPHAGITIS: ICD-10-CM

## 2023-01-30 PROCEDURE — 99213 OFFICE O/P EST LOW 20 MIN: CPT

## 2023-01-30 RX ORDER — ALBUTEROL SULFATE 2.5 MG/3ML
SOLUTION RESPIRATORY (INHALATION)
COMMUNITY
Start: 2022-11-23

## 2023-01-30 RX ORDER — UBIDECARENONE 100 MG
1 CAPSULE ORAL DAILY
COMMUNITY
Start: 2023-01-04

## 2023-01-30 RX ORDER — SERTRALINE HYDROCHLORIDE 25 MG/1
TABLET, FILM COATED ORAL
COMMUNITY
Start: 2022-11-18

## 2023-01-30 RX ORDER — MONTELUKAST SODIUM 10 MG/1
10 TABLET ORAL EVERY EVENING
COMMUNITY
Start: 2023-01-25

## 2023-01-30 NOTE — PROGRESS NOTES
Subjective   Darlene Gaviria is a 58 y.o. female who presents today for Initial Evaluation    Chief Complaint:    Chief Complaint   Patient presents with   • Hiatal Hernia        History of Present Illness:    History of Present Illness [place] is a 58-year-old female who reports that she has had a hiatal hernia that was found last year.  Reports that she began having symptoms that she thought was a stomach virus and was admitted to the hospital and then ended up having a heart procedure that she states found a hiatal hernia.  Reports that she has been seeing GI and recently had a colonoscopy and EGD that visualized hiatal hernia.  Reports that she has a history of a paralyzed diaphragm due to a personal history of non-Hodgkin's lymphoma and having a nerve removed in her diaphragm.  She does report that she has chest pain and shortness of breath.  States that she is recently started on Carafate and Pepcid several weeks ago that has helped her symptoms some.  However she states that she ate chicken wings at night several weeks ago and has had symptoms since then.  She does complain of some nausea yesterday and today, reports bloating after eating and abdominal pain.  She states that her acid reflux has not been controlled with her medications.  States that she is experienced it all day today and has experienced that yesterday.  Reports that all she ate yesterday was saltine crackers and was even experiencing reflux then.  She denies a past history of smoking.    The following portions of the patient's history were reviewed and updated as appropriate: allergies, current medications, past family history, past medical history, past social history, past surgical history and problem list.    Past Medical History:  Past Medical History:   Diagnosis Date   • Arthritis    • Asthma    • GERD (gastroesophageal reflux disease)    • Gout    • History of shingles     frequently has shingles   • Hx of hysterectomy    •  Hypertension    • Non Hodgkin's lymphoma (HCC) 2008    surgery to remove cancer, part of left lung @ [place], [place]   • Osteoarthritis    • Osteoporosis        Social History:  Social History     Socioeconomic History   • Marital status:    Tobacco Use   • Smoking status: Never   • Smokeless tobacco: Never   Vaping Use   • Vaping Use: Never used   Substance and Sexual Activity   • Alcohol use: No     Comment: former use   • Drug use: No   • Sexual activity: Defer       Family History:  Family History   Problem Relation Age of Onset   • Osteoporosis Mother    • Hypertension Mother    • Hypothyroidism Mother    • Hypertension Father    • Diabetes Father    • Heart disease Father    • Hypothyroidism Father    • Rheum arthritis Father    • Diabetes Brother    • Osteoporosis Maternal Grandmother    • Heart disease Maternal Grandmother    • Hypertension Maternal Grandmother    • Heart disease Maternal Grandfather    • Diabetes Maternal Grandfather    • Hypertension Maternal Grandfather    • Heart disease Paternal Grandmother    • Hypertension Paternal Grandmother    • Heart disease Paternal Grandfather    • Hypertension Paternal Grandfather        Past Surgical History:  Past Surgical History:   Procedure Laterality Date   • ABDOMINAL SURGERY     • APPENDECTOMY     • BREAST BIOPSY     • CARDIAC SURGERY      CARDIACTHORACIC SURGERY   •  SECTION     • CHEST SURGERY      thymus gland-left lung-vein removal left arm   • COLONOSCOPY     • COLONOSCOPY N/A 2022    Procedure: COLONOSCOPY FOR SCREENING;  Surgeon: Patricia Mohr MD;  Location: [facility] OR;  Service: Gastroenterology;  Laterality: N/A;   • ENDOSCOPY     • ENDOSCOPY N/A 2022    Procedure: ESOPHAGOGASTRODUODENOSCOPY WITH BIOPSY;  Surgeon: Patricia Mohr MD;  Location: [facility] OR;  Service: Gastroenterology;  Laterality: N/A;   • GALLBLADDER SURGERY     • HYSTERECTOMY     • LARYNGOPLASTY Left 10/02/2019    Procedure: LEFT  VOCAL CORD MEDIALIZATION , FLEX BRONCHOSCOPY ( 1.5 HOURS WILL NEED ECKERT MEDIALIZATION TRAY WITH 23 HOURS OBS );  Surgeon: Reginald Arguelles MD;  Location: [facility];  Service: ENT   • OTHER SURGICAL HISTORY      Diaphragm and left vocal cord paralysed.   • OTHER SURGICAL HISTORY      left vocal cord medialization        Problem List:  Patient Active Problem List   Diagnosis   • Mild intermittent asthma without complication   • Essential hypertension   • Chronic laryngitis   • Paralysis of larynx   • Dysphagia, pharyngoesophageal phase   • Status post surgery   • COVID-19   • Encounter for screening for malignant neoplasm of colon   • Hiatal hernia       Allergy:   Allergies   Allergen Reactions   • Metoprolol Other (See Comments)     Leg weakness        Current Medications:   Current Outpatient Medications   Medication Sig Dispense Refill   • albuterol (PROVENTIL) (2.5 MG/3ML) 0.083% nebulizer solution INHALE 3ML VIA NEBULIZER 3 TIMES DAILY FOR 11 DAYS     • amLODIPine (NORVASC) 5 MG tablet Take 5 mg by mouth Daily.     • calcium carbonate (OS-CORTNEY) 600 MG tablet Take 600 mg by mouth 2 (Two) Times a Day With Meals.     • carvedilol (COREG) 6.25 MG tablet Take 6.25 mg by mouth 2 (Two) Times a Day With Meals.     • D3-1000 25 MCG (1000 UT) capsule Take 1 capsule by mouth Daily.     • dexlansoprazole (DEXILANT) 60 MG capsule Take 1 capsule by mouth Every Morning Before Breakfast. 90 capsule 3   • Diclofenac Sodium (Pennsaid) 2 % solution Apply 1-2 Pump topically Every 12 (Twelve) Hours As Needed (pain). 112 g 0   • famotidine (PEPCID) 40 MG tablet Take 1 tablet by mouth 2 (Two) Times a Day As Needed for Indigestion or Heartburn for up to 30 days. 60 tablet 0   • hydroCHLOROthiazide (HYDRODIURIL) 12.5 MG tablet Take 12.5 mg by mouth Daily.     • linaclotide (LINZESS) 72 MCG capsule capsule Take 1 capsule by mouth Every Morning Before Breakfast. 30 capsule 11   • loratadine (CLARITIN) 10 MG tablet Take 10 mg by  mouth Daily.     • montelukast (SINGULAIR) 10 MG tablet Take 10 mg by mouth Every Evening.     • pregabalin (LYRICA) 75 MG capsule Take 75 mg by mouth Daily.     • ProAir  (90 Base) MCG/ACT inhaler      • sertraline (ZOLOFT) 25 MG tablet      • sucralfate (Carafate) 1 g tablet Take 1 tablet by mouth 4 (Four) Times a Day. 120 tablet 0   • Vitamin D, Cholecalciferol, 25 MCG (1000 UT) tablet        No current facility-administered medications for this visit.       Review of Systems:    Review of Systems   Constitutional: Negative for activity change.   HENT: Negative for congestion.    Eyes: Negative for blurred vision.   Respiratory: Positive for shortness of breath.    Cardiovascular: Positive for chest pain.   Gastrointestinal: Positive for abdominal pain and GERD.   Endocrine: Negative for cold intolerance.   Genitourinary: Negative for flank pain.   Musculoskeletal: Negative for arthralgias.   Skin: Negative for bruise.   Allergic/Immunologic: Negative for environmental allergies.   Neurological: Negative for confusion.   Hematological: Negative for adenopathy.   Psychiatric/Behavioral: Negative for agitation.         Physical Exam:   Physical Exam  Constitutional:       Appearance: Normal appearance.   HENT:      Head: Normocephalic and atraumatic.      Right Ear: External ear normal.      Left Ear: External ear normal.   Eyes:      Conjunctiva/sclera: Conjunctivae normal.      Pupils: Pupils are equal, round, and reactive to light.   Cardiovascular:      Rate and Rhythm: Normal rate and regular rhythm.      Pulses: Normal pulses.      Heart sounds: Normal heart sounds.   Pulmonary:      Effort: Pulmonary effort is normal.      Breath sounds: Normal breath sounds.   Abdominal:      General: Abdomen is flat.      Palpations: Abdomen is soft.   Musculoskeletal:         General: Normal range of motion.      Cervical back: Normal range of motion and neck supple.   Skin:     General: Skin is warm and dry.       "Capillary Refill: Capillary refill takes less than 2 seconds.   Neurological:      General: No focal deficit present.      Mental Status: She is alert and oriented to person, place, and time.   Psychiatric:         Mood and Affect: Mood normal.         Behavior: Behavior normal.         Vitals:  Blood pressure 102/70, pulse 85, height 172.7 cm (67.99\"), weight 87.5 kg (192 lb 12.8 oz).   Body mass index is 29.32 kg/m².         Assessment & Plan   Diagnoses and all orders for this visit:    1. Hiatal hernia (Primary)  -     Case Request; Standing  -     Case Request    2. Gastroesophageal reflux disease without esophagitis  -     Case Request; Standing  -     Case Request    Other orders  -     Follow Anesthesia Guidelines / Protocol; Future  -     Obtain Informed Consent; Future  -     Provide NPO Instructions to Patient; Future  -     Chlorhexidine Skin Prep; Future  -     Follow Anesthesia Guidelines / Protocol; Standing  -     Verify / Perform Chlorhexidine Skin Prep; Standing  -     Verify / Perform Chlorhexidine Skin Prep if Indicated (If Not Already Completed); Jailene Colon is a 58-year-old female who presents with complaints of GERD that is refractory to medication and diet changes and a hiatal hernia.  She will undergo an EGD with [place] with Dr. Newsome.  She verbalized understanding to be off Dexilant for 1 week and Pepcid for 3 days.  I have discussed that hiatal hernia will need further evaluation before surgery is an option.  She verbalized understanding.      Visit Diagnoses:    ICD-10-CM ICD-9-CM   1. Hiatal hernia  K44.9 553.3   2. Gastroesophageal reflux disease without esophagitis  K21.9 530.81         MEDS ORDERED DURING VISIT:  No orders of the defined types were placed in this encounter.      No follow-ups on file.             This document has been electronically signed by NATHALIE Coffman  January 30, 2023 13:35 EST    Please note that portions of this note were completed with a " voice recognition program.

## 2023-01-31 ENCOUNTER — TELEPHONE (OUTPATIENT)
Dept: SURGERY | Facility: CLINIC | Age: 58
End: 2023-01-31
Payer: COMMERCIAL

## 2023-01-31 NOTE — TELEPHONE ENCOUNTER
ATTEMPTED TO CALL PATIENT TO SCHEDULE EGD / HAYES WITH DR. AKINS. LEFT VOICE MAIL MESSAGES ON HOME PHONE AND CELL PHONE VOICE MAIL.    I WILL CONTINUE TO CONTACT PATIENT TO SCHEDULE EGD / BRAVO PROCEDURE.

## 2023-02-01 PROBLEM — K21.9 GASTROESOPHAGEAL REFLUX DISEASE WITHOUT ESOPHAGITIS: Status: ACTIVE | Noted: 2023-02-01

## 2023-02-17 ENCOUNTER — ANESTHESIA (OUTPATIENT)
Dept: PERIOP | Facility: HOSPITAL | Age: 58
End: 2023-02-17
Payer: COMMERCIAL

## 2023-02-17 ENCOUNTER — HOSPITAL ENCOUNTER (OUTPATIENT)
Facility: HOSPITAL | Age: 58
Setting detail: HOSPITAL OUTPATIENT SURGERY
Discharge: HOME | End: 2023-02-17
Attending: SURGERY | Admitting: SURGERY
Payer: COMMERCIAL

## 2023-02-17 ENCOUNTER — ANESTHESIA EVENT (OUTPATIENT)
Dept: PERIOP | Facility: HOSPITAL | Age: 58
End: 2023-02-17
Payer: COMMERCIAL

## 2023-02-17 VITALS
RESPIRATION RATE: 20 BRPM | TEMPERATURE: 97.4 F | WEIGHT: 189 LBS | BODY MASS INDEX: 28.64 KG/M2 | HEIGHT: 68 IN | OXYGEN SATURATION: 100 % | SYSTOLIC BLOOD PRESSURE: 105 MMHG | DIASTOLIC BLOOD PRESSURE: 83 MMHG | HEART RATE: 79 BPM

## 2023-02-17 DIAGNOSIS — K44.9 HIATAL HERNIA: ICD-10-CM

## 2023-02-17 DIAGNOSIS — K21.9 GASTROESOPHAGEAL REFLUX DISEASE WITHOUT ESOPHAGITIS: ICD-10-CM

## 2023-02-17 PROCEDURE — 25010000002 PROPOFOL 10 MG/ML EMULSION: Performed by: NURSE ANESTHETIST, CERTIFIED REGISTERED

## 2023-02-17 RX ORDER — SODIUM CHLORIDE 9 MG/ML
40 INJECTION, SOLUTION INTRAVENOUS AS NEEDED
Status: DISCONTINUED | OUTPATIENT
Start: 2023-02-17 | End: 2023-02-17 | Stop reason: HOSPADM

## 2023-02-17 RX ORDER — FENTANYL CITRATE 50 UG/ML
50 INJECTION, SOLUTION INTRAMUSCULAR; INTRAVENOUS
Status: DISCONTINUED | OUTPATIENT
Start: 2023-02-17 | End: 2023-02-17 | Stop reason: HOSPADM

## 2023-02-17 RX ORDER — SODIUM CHLORIDE, SODIUM LACTATE, POTASSIUM CHLORIDE, CALCIUM CHLORIDE 600; 310; 30; 20 MG/100ML; MG/100ML; MG/100ML; MG/100ML
100 INJECTION, SOLUTION INTRAVENOUS ONCE AS NEEDED
Status: DISCONTINUED | OUTPATIENT
Start: 2023-02-17 | End: 2023-02-17 | Stop reason: HOSPADM

## 2023-02-17 RX ORDER — OXYCODONE HYDROCHLORIDE AND ACETAMINOPHEN 5; 325 MG/1; MG/1
1 TABLET ORAL ONCE AS NEEDED
Status: DISCONTINUED | OUTPATIENT
Start: 2023-02-17 | End: 2023-02-17 | Stop reason: HOSPADM

## 2023-02-17 RX ORDER — ONDANSETRON 2 MG/ML
4 INJECTION INTRAMUSCULAR; INTRAVENOUS AS NEEDED
Status: DISCONTINUED | OUTPATIENT
Start: 2023-02-17 | End: 2023-02-17 | Stop reason: HOSPADM

## 2023-02-17 RX ORDER — SODIUM CHLORIDE, SODIUM LACTATE, POTASSIUM CHLORIDE, CALCIUM CHLORIDE 600; 310; 30; 20 MG/100ML; MG/100ML; MG/100ML; MG/100ML
125 INJECTION, SOLUTION INTRAVENOUS ONCE
Status: COMPLETED | OUTPATIENT
Start: 2023-02-17 | End: 2023-02-17

## 2023-02-17 RX ORDER — MEPERIDINE HYDROCHLORIDE 25 MG/ML
12.5 INJECTION INTRAMUSCULAR; INTRAVENOUS; SUBCUTANEOUS
Status: DISCONTINUED | OUTPATIENT
Start: 2023-02-17 | End: 2023-02-17 | Stop reason: HOSPADM

## 2023-02-17 RX ORDER — MIDAZOLAM HYDROCHLORIDE 1 MG/ML
1 INJECTION INTRAMUSCULAR; INTRAVENOUS
Status: DISCONTINUED | OUTPATIENT
Start: 2023-02-17 | End: 2023-02-17 | Stop reason: HOSPADM

## 2023-02-17 RX ORDER — PROPOFOL 10 MG/ML
VIAL (ML) INTRAVENOUS AS NEEDED
Status: DISCONTINUED | OUTPATIENT
Start: 2023-02-17 | End: 2023-02-17 | Stop reason: SURG

## 2023-02-17 RX ORDER — SODIUM CHLORIDE 0.9 % (FLUSH) 0.9 %
10 SYRINGE (ML) INJECTION AS NEEDED
Status: DISCONTINUED | OUTPATIENT
Start: 2023-02-17 | End: 2023-02-17 | Stop reason: HOSPADM

## 2023-02-17 RX ORDER — SODIUM CHLORIDE 0.9 % (FLUSH) 0.9 %
10 SYRINGE (ML) INJECTION EVERY 12 HOURS SCHEDULED
Status: DISCONTINUED | OUTPATIENT
Start: 2023-02-17 | End: 2023-02-17 | Stop reason: HOSPADM

## 2023-02-17 RX ORDER — IPRATROPIUM BROMIDE AND ALBUTEROL SULFATE 2.5; .5 MG/3ML; MG/3ML
3 SOLUTION RESPIRATORY (INHALATION) ONCE AS NEEDED
Status: DISCONTINUED | OUTPATIENT
Start: 2023-02-17 | End: 2023-02-17 | Stop reason: HOSPADM

## 2023-02-17 RX ADMIN — PROPOFOL 120 MG: 10 INJECTION, EMULSION INTRAVENOUS at 11:25

## 2023-02-17 RX ADMIN — SODIUM CHLORIDE, POTASSIUM CHLORIDE, SODIUM LACTATE AND CALCIUM CHLORIDE: 600; 310; 30; 20 INJECTION, SOLUTION INTRAVENOUS at 11:22

## 2023-02-17 RX ADMIN — PROPOFOL 30 MG: 10 INJECTION, EMULSION INTRAVENOUS at 11:28

## 2023-02-17 NOTE — ANESTHESIA PREPROCEDURE EVALUATION
Anesthesia Evaluation     Patient summary reviewed and Nursing notes reviewed   no history of anesthetic complications:  NPO Solid Status: > 8 hours  NPO Liquid Status: > 8 hours           Airway   Mallampati: I  TM distance: >3 FB  Neck ROM: full  No difficulty expected  Dental          Pulmonary - normal exam    breath sounds clear to auscultation  (+) asthma,  Cardiovascular - normal exam    ECG reviewed  Patient on routine beta blocker and Beta blocker given within 24 hours of surgery  Rhythm: regular  Rate: normal    (+) hypertension,       Neuro/Psych- negative ROS  GI/Hepatic/Renal/Endo    (+)  hiatal hernia, GERD,      Musculoskeletal     Abdominal  - normal exam   Substance History - negative use     OB/GYN negative ob/gyn ROS         Other   arthritis,    history of cancer (Non-Hodgkin's Lymphoma) remission    ROS/Med Hx Other: Echo 4/6/2022:  ·The study is technically difficult for diagnosis.  ·Left ventricular ejection fraction appears to be 56 - 60%. Left ventricular systolic function is normal.  ·The cardiac valves are structurally and functionally within normal limits.     Vocal cord paralysis left side and left lung lower lobe cancer.                    Anesthesia Plan    ASA 3     general     intravenous induction     Anesthetic plan, risks, benefits, and alternatives have been provided, discussed and informed consent has been obtained with: patient and spouse/significant other.  Pre-procedure education provided  Use of blood products discussed with consented to blood products.   Plan discussed with CRNA.        CODE STATUS:

## 2023-02-17 NOTE — INTERVAL H&P NOTE
H&P reviewed.  The patient was examined and there are no changes to the H&P.  To endoscopy for EGD Bravo

## 2023-02-20 LAB — REF LAB TEST METHOD: NORMAL

## 2023-03-02 ENCOUNTER — OFFICE VISIT (OUTPATIENT)
Dept: SURGERY | Facility: CLINIC | Age: 58
End: 2023-03-02
Payer: COMMERCIAL

## 2023-03-02 VITALS
DIASTOLIC BLOOD PRESSURE: 73 MMHG | HEART RATE: 89 BPM | BODY MASS INDEX: 29.46 KG/M2 | HEIGHT: 68 IN | SYSTOLIC BLOOD PRESSURE: 111 MMHG | WEIGHT: 194.4 LBS

## 2023-03-02 DIAGNOSIS — R13.14 DYSPHAGIA, PHARYNGOESOPHAGEAL PHASE: Primary | ICD-10-CM

## 2023-03-02 NOTE — PROGRESS NOTES
"Patient Name:  Darlene Gaviria  YOB: 1965  [id number]    Surgery Progress Note    Date of visit: 3/2/2023    Subjective   Subjective:     Ms. Gaviria is a 58-year-old female who I have seen in consultation for hiatal hernia was found on prior imaging.  She has been treated for possible GERD symptoms with Dexilant.  It does not change her symptoms.  She had an upper GI study which demonstrated small sliding hiatal hernia with multiple episodes of reflux of contrast, elevated left hemidiaphragm and esophageal dysmotility with diffuse esophageal contractions.  I recently performed an EGD with Bravo pH probe.  There is an extremely small sliding hiatal hernia.  DeMeester score was 13.2, which is normal.  She reports no changes in symptoms when stopping her medicines.    Of note, the patient has a history of resection of a mediastinal mass and partial lung resection through median sternotomy.  This resulted in paralysis of her left hemidiaphragm as well as a vocal cord.         Objective     Objective:     /73   Pulse 89   Ht 172.7 cm (68\")   Wt 88.2 kg (194 lb 6.4 oz)   BMI 29.56 kg/m²       Constitution: No acute distress  CV:  Rhythm  regular and rate regular   L:  Symmetric chest expansion. No respiratory distress  Abd:   soft, nondistended, nontender   Ext:  No cyanosis, clubbing, edema    Recent labs that are back at this time have been reviewed.     I personally reviewed and interpreted the patient's Bravo pH study.  DeMeester score was 13.2, which is a normal study.  This points against gastroesophageal reflux disease    Upper GI study demonstrated a small sliding hiatal hernia with multiple episodes of reflux of contrast, elevation of the left hemidiaphragm, esophageal dysmotility with diffuse esophageal contractions.       Assessment & Plan     Assessment/ Plan:    58-year-old female with possible esophageal dysmotility.  Work-up (Bravo pH study) points against gastroesophageal " reflux disease    Normal Bravo pH study points against acid reflux from the stomach.  However, upper GI did demonstrate significant esophageal dysmotility.  She has a history of resection of mediastinal mass that resulted in paralysis of her vocal cord (recurrent laryngeal nerve and vagus nerve) and left hemidiaphragm (phrenic nerve).  This brings about the possibility that injury to the vagus nerve could contribute to esophageal dysmotility.  I will order esophageal manometry to be performed in [place].        David Newsome MD  [facility]  3/2/2023  14:57 EST

## 2023-03-03 ENCOUNTER — DOCUMENTATION (OUTPATIENT)
Dept: SURGERY | Facility: CLINIC | Age: 58
End: 2023-03-03
Payer: COMMERCIAL

## 2023-03-03 ENCOUNTER — TELEPHONE (OUTPATIENT)
Dept: SURGERY | Facility: CLINIC | Age: 58
End: 2023-03-03
Payer: COMMERCIAL

## 2023-03-03 DIAGNOSIS — Z01.818 PRE-OP TESTING: Primary | ICD-10-CM

## 2023-03-03 NOTE — PROGRESS NOTES
FAXED NOTES / PROCEDURE NOTES / REFERRAL FORM TO  [facility] -[phone].    COVID TEST ORDER HAS BEEN PLACED IN Epic, FOR PATIENT TO COMPLETE DAY PRIOR TO MANOMETRY STUDY.

## 2023-03-03 NOTE — TELEPHONE ENCOUNTER
SPOKE TO PATIENT GIVING HER THE FOLLOWING SCHEDULE:    Thursday MARCH 23, 2023 ARRIVE AT 11:00 AT [facility].  OUTPATIENT ENTRANCE, GO TO REGISTRATION AND THEY WILL DIRECT YOU FROM THERE.   NOTHING TO EAT OR DRINK 6 HOURS PRIOR.    [place] VERBALIZED UNDERSTANDING AND AGREEMENT WITH THE ABOVE SCHEDULE.    INSTRUCTED TO CALL OUR OFFICE [phone] WITH ANY QUESTIONS.

## 2023-03-09 ENCOUNTER — DOCUMENTATION (OUTPATIENT)
Dept: SURGERY | Facility: CLINIC | Age: 58
End: 2023-03-09
Payer: COMMERCIAL

## 2023-03-14 ENCOUNTER — TELEPHONE (OUTPATIENT)
Dept: SURGERY | Facility: CLINIC | Age: 58
End: 2023-03-14
Payer: COMMERCIAL

## 2023-03-14 NOTE — TELEPHONE ENCOUNTER
SPOKE TO PATIENT WHOM STATES SHE HAS AN APPOINTMENT FOR HER MANOMETRY STUDY ON Thursday 03/23/23 @ [facility].    WE WILL FOLLOW STUDY FOR RESULTS AND SCHEDULE HER A FOLLOW UP WITH DR. AKINS AT THAT TIME.    PATIENT VERBALIZED UNDERSTANDING OF THE ABOVE CONVERSATION.

## 2023-03-20 ENCOUNTER — TELEPHONE (OUTPATIENT)
Dept: SURGERY | Facility: CLINIC | Age: 58
End: 2023-03-20
Payer: COMMERCIAL

## 2023-03-20 NOTE — TELEPHONE ENCOUNTER
SPOKE TO PATIENT TO VERIFY HER SCHEDULE BELOW:    MANOMETRY STUDY:  Thursday MARCH 23, 2023  ARRIVE BY 11:30 AM AT [facility].    NOTHING TO EAT OR DRINK AFTER MIDNIGHT ON WED MARCH 22, 2023.    PATIENT WAS GIVEN [facility] ENDOSCOPY PHONE NUMBER [phone], SHOULD SHE NEED FURTHER INSTRUCTIONS.    PATIENT VERBALIZED UNDERSTANDING AND AGREEMENT TO THE ABOVE SCHEDULE, DATE, TIME, LOCATIONS, PREP, PROCESS.    INSTRUCTED PATIENT TO CALL OUR OFFICE IF SHE HAS ANY QUESTIONS -[phone].

## 2023-03-23 ENCOUNTER — HOSPITAL ENCOUNTER (OUTPATIENT)
Facility: HOSPITAL | Age: 58
Setting detail: HOSPITAL OUTPATIENT SURGERY
Discharge: HOME | End: 2023-03-23
Attending: SURGERY | Admitting: SURGERY
Payer: COMMERCIAL

## 2023-03-23 PROCEDURE — 91010 ESOPHAGUS MOTILITY STUDY: CPT | Performed by: SURGERY

## 2023-03-23 RX ORDER — LIDOCAINE HYDROCHLORIDE 20 MG/ML
SOLUTION OROPHARYNGEAL AS NEEDED
Status: DISCONTINUED | OUTPATIENT
Start: 2023-03-23 | End: 2023-03-23 | Stop reason: HOSPADM

## 2023-03-30 ENCOUNTER — OFFICE VISIT (OUTPATIENT)
Dept: SURGERY | Facility: CLINIC | Age: 58
End: 2023-03-30
Payer: COMMERCIAL

## 2023-03-30 VITALS
HEART RATE: 87 BPM | DIASTOLIC BLOOD PRESSURE: 69 MMHG | BODY MASS INDEX: 29.28 KG/M2 | HEIGHT: 68 IN | SYSTOLIC BLOOD PRESSURE: 103 MMHG | WEIGHT: 193.2 LBS

## 2023-03-30 DIAGNOSIS — R13.14 DYSPHAGIA, PHARYNGOESOPHAGEAL PHASE: Primary | ICD-10-CM

## 2023-03-30 PROCEDURE — 1159F MED LIST DOCD IN RCRD: CPT | Performed by: SURGERY

## 2023-03-30 PROCEDURE — 3074F SYST BP LT 130 MM HG: CPT | Performed by: SURGERY

## 2023-03-30 PROCEDURE — 99212 OFFICE O/P EST SF 10 MIN: CPT | Performed by: SURGERY

## 2023-03-30 PROCEDURE — 3078F DIAST BP <80 MM HG: CPT | Performed by: SURGERY

## 2023-03-30 PROCEDURE — 1160F RVW MEDS BY RX/DR IN RCRD: CPT | Performed by: SURGERY

## 2023-03-30 NOTE — PROGRESS NOTES
"Patient Name:  Darlene Gaviria  YOB: 1965  [id number]    Surgery Progress Note    Date of visit: 3/30/2023    Subjective   Subjective:   Ms. Gaviria is a 58-year-old female who I have seen in consultation for hiatal hernia was found on prior imaging.  She has been treated for possible GERD symptoms with Dexilant.  It does not change her symptoms.  She had an upper GI study which demonstrated small sliding hiatal hernia with multiple episodes of reflux of contrast, elevated left hemidiaphragm and esophageal dysmotility with diffuse esophageal contractions.  I recently performed an EGD with Bravo pH probe.  There is an extremely small sliding hiatal hernia.  DeMeester score was 13.2, which is normal.  She reports no changes in symptoms when stopping her medicines.     Of note, the patient has a history of resection of a mediastinal mass and partial lung resection through median sternotomy.  This resulted in paralysis of her left hemidiaphragm as well as a vocal cord.       Interval history: Patient went to manometry but probe couldn't be placed. Patient states the tech seemed very timid and did not want to proceed.   Patient's symptoms have improved. She did have one episode of regurgitation with salad over the past month but has otherwise done well. She has decreased her dexilant to every other day.          Objective     Objective:     /69   Pulse 87   Ht 172.7 cm (68\")   Wt 87.6 kg (193 lb 3.2 oz)   BMI 29.38 kg/m²       Constitution: No acute distress  CV:  Rhythm  regular and rate regular   L:  Symmetric chest expansion. No respiratory distress  Abd:   soft, nondistended, nontender   Ext:  No cyanosis, clubbing, edema    Recent labs that are back at this time have been reviewed.      I personally reviewed and interpreted the patient's Bravo pH study.  DeMeester score was 13.2, which is a normal study.  This points against gastroesophageal reflux disease     Upper GI study " demonstrated a small sliding hiatal hernia with multiple episodes of reflux of contrast, elevation of the left hemidiaphragm, esophageal dysmotility with diffuse esophageal contractions.         Assessment & Plan     Assessment/ Plan:       58-year-old female with possible esophageal dysmotility.  Work-up (Bravo pH study) points against gastroesophageal reflux disease     Normal Bravo pH study points against acid reflux from the stomach.  However, upper GI did demonstrate significant esophageal dysmotility.  She has a history of resection of mediastinal mass that resulted in paralysis of her vocal cord (recurrent laryngeal nerve and vagus nerve) and left hemidiaphragm (phrenic nerve).  This brings about the possibility that injury to the vagus nerve could contribute to esophageal dysmotility.    Patient's symptoms have improved.  If they worsen, we may readdress the manometry.  Otherwise follow-up as needed        David Newsome MD  [facility]  3/30/2023  10:10 EDT

## 2023-10-18 DIAGNOSIS — K59.04 CHRONIC IDIOPATHIC CONSTIPATION: ICD-10-CM

## 2024-05-14 ENCOUNTER — HOSPITAL ENCOUNTER (OUTPATIENT)
Dept: GENERAL RADIOLOGY | Facility: HOSPITAL | Age: 59
Discharge: HOME | End: 2024-05-14
Payer: COMMERCIAL

## 2024-05-14 ENCOUNTER — LAB (OUTPATIENT)
Dept: LAB | Facility: HOSPITAL | Age: 59
End: 2024-05-14
Payer: COMMERCIAL

## 2024-05-14 ENCOUNTER — TELEPHONE (OUTPATIENT)
Dept: PULMONOLOGY | Facility: CLINIC | Age: 59
End: 2024-05-14
Payer: COMMERCIAL

## 2024-05-14 ENCOUNTER — OFFICE VISIT (OUTPATIENT)
Dept: PULMONOLOGY | Facility: CLINIC | Age: 59
End: 2024-05-14
Payer: COMMERCIAL

## 2024-05-14 VITALS
TEMPERATURE: 97.3 F | DIASTOLIC BLOOD PRESSURE: 68 MMHG | SYSTOLIC BLOOD PRESSURE: 122 MMHG | WEIGHT: 181.8 LBS | BODY MASS INDEX: 27.55 KG/M2 | OXYGEN SATURATION: 98 % | HEART RATE: 87 BPM | HEIGHT: 68 IN

## 2024-05-14 DIAGNOSIS — J98.6 DIAPHRAGM DYSFUNCTION: ICD-10-CM

## 2024-05-14 DIAGNOSIS — J45.40 MODERATE PERSISTENT ASTHMA WITHOUT COMPLICATION: ICD-10-CM

## 2024-05-14 DIAGNOSIS — R06.02 SHORTNESS OF BREATH: ICD-10-CM

## 2024-05-14 DIAGNOSIS — J45.40 MODERATE PERSISTENT ASTHMA WITHOUT COMPLICATION: Primary | ICD-10-CM

## 2024-05-14 DIAGNOSIS — E66.3 OVERWEIGHT: ICD-10-CM

## 2024-05-14 DIAGNOSIS — J98.6 DIAPHRAGM DYSFUNCTION: Primary | ICD-10-CM

## 2024-05-14 PROCEDURE — 82785 ASSAY OF IGE: CPT

## 2024-05-14 PROCEDURE — 3074F SYST BP LT 130 MM HG: CPT | Performed by: NURSE PRACTITIONER

## 2024-05-14 PROCEDURE — 3078F DIAST BP <80 MM HG: CPT | Performed by: NURSE PRACTITIONER

## 2024-05-14 PROCEDURE — 71046 X-RAY EXAM CHEST 2 VIEWS: CPT | Performed by: RADIOLOGY

## 2024-05-14 PROCEDURE — 36415 COLL VENOUS BLD VENIPUNCTURE: CPT

## 2024-05-14 PROCEDURE — 85025 COMPLETE CBC W/AUTO DIFF WBC: CPT

## 2024-05-14 PROCEDURE — 1159F MED LIST DOCD IN RCRD: CPT | Performed by: NURSE PRACTITIONER

## 2024-05-14 PROCEDURE — 99214 OFFICE O/P EST MOD 30 MIN: CPT | Performed by: NURSE PRACTITIONER

## 2024-05-14 PROCEDURE — 71046 X-RAY EXAM CHEST 2 VIEWS: CPT

## 2024-05-14 PROCEDURE — 1160F RVW MEDS BY RX/DR IN RCRD: CPT | Performed by: NURSE PRACTITIONER

## 2024-05-14 RX ORDER — PREDNISONE 5 MG/1
TABLET ORAL SEE ADMIN INSTRUCTIONS
COMMUNITY
Start: 2024-01-17

## 2024-05-14 RX ORDER — INDOMETHACIN 50 MG/1
CAPSULE ORAL
COMMUNITY
Start: 2024-04-18

## 2024-05-14 RX ORDER — METRONIDAZOLE 500 MG/1
1 TABLET ORAL EVERY 12 HOURS SCHEDULED
COMMUNITY
Start: 2024-02-12

## 2024-05-14 RX ORDER — FLUTICASONE PROPIONATE AND SALMETEROL XINAFOATE 115; 21 UG/1; UG/1
2 AEROSOL, METERED RESPIRATORY (INHALATION) 2 TIMES DAILY
COMMUNITY
Start: 2024-05-01

## 2024-05-14 RX ORDER — LEVOCETIRIZINE DIHYDROCHLORIDE 5 MG/1
5 TABLET, FILM COATED ORAL EVERY EVENING
COMMUNITY
Start: 2024-04-16

## 2024-05-14 NOTE — PROGRESS NOTES
"Chief Complaint  Cough and Asthma    Subjective        Darlene Gaviria presents to [facility] PULMONARY [facility]IT[facility]ARE MEDICINE  History of Present Illness    Ms. Gaviria is a 59 year old female with a medical history significant for non hodgkin's lymphoma, arthritis, asthma, GERD, gout, and hypertension.    She presents today for evaluation of cough and asthma.  She states that she has a history of asthma and was previously seen by Dr. Cano.  She states that she is currently on Advair BID and albuterol as needed.  She reports that for the most part she feels well controlled.  She states that she does have some shortness of breath with exertion.  She is a life long non smoker.  She also tells me that she has a history of cancer.  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  She had part of her left lung removed and that she also has a paralyzed left diaphragm and paralyzed left vocal cord.        Objective   Vital Signs:  /68   Pulse 87   Temp 97.3 °F (36.3 °C)   Ht 172.7 cm (68\")   Wt 82.5 kg (181 lb 12.8 oz)   SpO2 98%   BMI 27.64 kg/m²   Estimated body mass index is 27.64 kg/m² as calculated from the following:    Height as of this encounter: 172.7 cm (68\").    Weight as of this encounter: 82.5 kg (181 lb 12.8 oz).       BMI is >= 25 and <30. (Overweight) The following options were offered after discussion;: exercise counseling/recommendations and nutrition counseling/recommendations      Physical Exam     GENERAL APPEARANCE: Well developed, well nourished, alert and cooperative, and appears to be in no acute distress.    HEAD: normocephalic. Atraumatic.    EYES: PERRL, EOMI. Vision is grossly intact.    THROAT: Oral cavity and pharynx normal. No inflammation, swelling, exudate, or lesions.     NECK: Neck supple.  No thyromegaly.    CARDIAC: Normal S1 and S2. No S3, S4 or murmurs. Rhythm is regular.     RESPIRATORY:Bilateral air entry " positive. Bilateral diminished breath sounds. No wheezing, crackles or rhonchi noted.    GI: Positive bowel sounds. Soft, nondistended, nontender.     MUSCULOSKELETAL: No significant deformity or joint abnormality. No edema. Peripheral pulses intact. No varicosities.    NEUROLOGICAL: Strength and sensation symmetric and intact throughout.     PSYCHIATRIC: The mental examination revealed the patient was oriented to person, place, and time.     Result Review :    The following data was reviewed by: NATHALIE Ortega on 05/14/2024:                 Assessment and Plan     Diagnoses and all orders for this visit:    1. Moderate persistent asthma without complication (Primary)  -     XR Chest 2 View; Future  -     CBC & Differential; Future  -     IgE Level; Future  -     Complete PFT - Pre & Post Bronchodilator; Future    2. Overweight    3. Diaphragm dysfunction        Ordered PFT to assess lung function.  Ordered CBC and IgE level.    Ordered chest xray.    Continue Advair BID.  Continue albuterol as needed.             Follow Up     Return in about 2 months (around 7/14/2024).  Patient was given instructions and counseling regarding her condition or for health maintenance advice. Please see specific information pulled into the AVS if appropriate.

## 2024-05-15 LAB
BASOPHILS # BLD AUTO: 0.02 10*3/MM3 (ref 0–0.2)
BASOPHILS NFR BLD AUTO: 0.4 % (ref 0–1.5)
DEPRECATED RDW RBC AUTO: 42.5 FL (ref 37–54)
EOSINOPHIL # BLD AUTO: 0.09 10*3/MM3 (ref 0–0.4)
EOSINOPHIL NFR BLD AUTO: 1.7 % (ref 0.3–6.2)
ERYTHROCYTE [DISTWIDTH] IN BLOOD BY AUTOMATED COUNT: 13 % (ref 12.3–15.4)
HCT VFR BLD AUTO: 41.1 % (ref 34–46.6)
HGB BLD-MCNC: 13.6 G/DL (ref 12–15.9)
IMM GRANULOCYTES # BLD AUTO: 0.01 10*3/MM3 (ref 0–0.05)
IMM GRANULOCYTES NFR BLD AUTO: 0.2 % (ref 0–0.5)
LYMPHOCYTES # BLD AUTO: 1.58 10*3/MM3 (ref 0.7–3.1)
LYMPHOCYTES NFR BLD AUTO: 29.2 % (ref 19.6–45.3)
MCH RBC QN AUTO: 30 PG (ref 26.6–33)
MCHC RBC AUTO-ENTMCNC: 33.1 G/DL (ref 31.5–35.7)
MCV RBC AUTO: 90.7 FL (ref 79–97)
MONOCYTES # BLD AUTO: 0.47 10*3/MM3 (ref 0.1–0.9)
MONOCYTES NFR BLD AUTO: 8.7 % (ref 5–12)
NEUTROPHILS NFR BLD AUTO: 3.25 10*3/MM3 (ref 1.7–7)
NEUTROPHILS NFR BLD AUTO: 59.8 % (ref 42.7–76)
NRBC BLD AUTO-RTO: 0 /100 WBC (ref 0–0.2)
PLATELET # BLD AUTO: 207 10*3/MM3 (ref 140–450)
PMV BLD AUTO: 12.2 FL (ref 6–12)
RBC # BLD AUTO: 4.53 10*6/MM3 (ref 3.77–5.28)
WBC NRBC COR # BLD AUTO: 5.42 10*3/MM3 (ref 3.4–10.8)

## 2024-05-17 LAB — IGE SERPL-ACNC: 10 IU/ML (ref 6–495)

## 2024-06-17 ENCOUNTER — HOSPITAL ENCOUNTER (OUTPATIENT)
Dept: CT IMAGING | Facility: HOSPITAL | Age: 59
Discharge: HOME | End: 2024-06-17
Payer: COMMERCIAL

## 2024-06-17 ENCOUNTER — HOSPITAL ENCOUNTER (OUTPATIENT)
Dept: RESPIRATORY THERAPY | Facility: HOSPITAL | Age: 59
Discharge: HOME | End: 2024-06-17
Payer: COMMERCIAL

## 2024-06-17 DIAGNOSIS — R06.02 SHORTNESS OF BREATH: ICD-10-CM

## 2024-06-17 DIAGNOSIS — J45.40 MODERATE PERSISTENT ASTHMA WITHOUT COMPLICATION: ICD-10-CM

## 2024-06-17 DIAGNOSIS — J98.6 DIAPHRAGM DYSFUNCTION: ICD-10-CM

## 2024-06-17 PROCEDURE — 94726 PLETHYSMOGRAPHY LUNG VOLUMES: CPT | Performed by: INTERNAL MEDICINE

## 2024-06-17 PROCEDURE — 94729 DIFFUSING CAPACITY: CPT

## 2024-06-17 PROCEDURE — 71250 CT THORAX DX C-: CPT | Performed by: RADIOLOGY

## 2024-06-17 PROCEDURE — 94060 EVALUATION OF WHEEZING: CPT | Performed by: INTERNAL MEDICINE

## 2024-06-17 PROCEDURE — 94640 AIRWAY INHALATION TREATMENT: CPT

## 2024-06-17 PROCEDURE — 94729 DIFFUSING CAPACITY: CPT | Performed by: INTERNAL MEDICINE

## 2024-06-17 PROCEDURE — 71250 CT THORAX DX C-: CPT

## 2024-06-17 PROCEDURE — 94726 PLETHYSMOGRAPHY LUNG VOLUMES: CPT

## 2024-06-17 PROCEDURE — 94060 EVALUATION OF WHEEZING: CPT

## 2024-06-17 RX ORDER — ALBUTEROL SULFATE 2.5 MG/3ML
2.5 SOLUTION RESPIRATORY (INHALATION) ONCE
Status: COMPLETED | OUTPATIENT
Start: 2024-06-17 | End: 2024-06-17

## 2024-06-17 RX ADMIN — ALBUTEROL SULFATE 2.5 MG: 2.5 SOLUTION RESPIRATORY (INHALATION) at 13:51

## 2024-06-18 ENCOUNTER — DOCUMENTATION (OUTPATIENT)
Dept: PULMONOLOGY | Facility: CLINIC | Age: 59
End: 2024-06-18
Payer: COMMERCIAL

## 2024-06-18 NOTE — PROGRESS NOTES
Discussed CT Chest and PFT with the patient in detail.  PFT is notable for restriction. CT chest shows elevated left hemidiaphragm and some scarring vs atelectasis in the left lower lobe.  We will continue inhalers for now as they are providing some symptomatic relief.

## 2024-07-15 ENCOUNTER — OFFICE VISIT (OUTPATIENT)
Dept: PULMONOLOGY | Facility: CLINIC | Age: 59
End: 2024-07-15
Payer: COMMERCIAL

## 2024-07-15 VITALS
SYSTOLIC BLOOD PRESSURE: 130 MMHG | TEMPERATURE: 96.8 F | HEIGHT: 68 IN | HEART RATE: 95 BPM | DIASTOLIC BLOOD PRESSURE: 88 MMHG | OXYGEN SATURATION: 99 % | BODY MASS INDEX: 28.16 KG/M2 | WEIGHT: 185.8 LBS

## 2024-07-15 DIAGNOSIS — E66.3 OVERWEIGHT: ICD-10-CM

## 2024-07-15 DIAGNOSIS — R06.02 SHORTNESS OF BREATH: ICD-10-CM

## 2024-07-15 DIAGNOSIS — J98.6 DIAPHRAGM DYSFUNCTION: Primary | ICD-10-CM

## 2024-07-15 DIAGNOSIS — J45.40 MODERATE PERSISTENT ASTHMA WITHOUT COMPLICATION: ICD-10-CM

## 2024-07-15 PROCEDURE — 99214 OFFICE O/P EST MOD 30 MIN: CPT | Performed by: NURSE PRACTITIONER

## 2024-07-15 PROCEDURE — 1160F RVW MEDS BY RX/DR IN RCRD: CPT | Performed by: NURSE PRACTITIONER

## 2024-07-15 PROCEDURE — 3079F DIAST BP 80-89 MM HG: CPT | Performed by: NURSE PRACTITIONER

## 2024-07-15 PROCEDURE — 1159F MED LIST DOCD IN RCRD: CPT | Performed by: NURSE PRACTITIONER

## 2024-07-15 PROCEDURE — 3075F SYST BP GE 130 - 139MM HG: CPT | Performed by: NURSE PRACTITIONER

## 2024-07-15 RX ORDER — OMEPRAZOLE 40 MG/1
CAPSULE, DELAYED RELEASE ORAL
COMMUNITY
Start: 2024-07-01

## 2024-07-15 RX ORDER — CALCIUM CARBONATE/VITAMIN D3 600MG-5MCG
1 TABLET ORAL EVERY 12 HOURS SCHEDULED
COMMUNITY
Start: 2024-07-11

## 2024-07-15 NOTE — PROGRESS NOTES
"Chief Complaint  Moderate persistent asthma without complication    Subjective        Darlene Gaviria presents to [facility] PULMONARY & CRITICAL CARE MEDICINE  History of Present Illness    Ms. Gaviria is a 59 year old female with a medical history significant for arthritis, GERD, asthma, Non Hodgkin's lymphoma, gout, and hypertension.    She presents today for follow up on asthma.  She reports that she has been doing well since her last visit.  She states that her breathing is at baseline.  She has done a PFT and CT Chest since her last visit.    Objective   Vital Signs:  /88   Pulse 95   Temp 96.8 °F (36 °C)   Ht 172.7 cm (68\")   Wt 84.3 kg (185 lb 12.8 oz)   SpO2 99%   BMI 28.25 kg/m²   Estimated body mass index is 28.25 kg/m² as calculated from the following:    Height as of this encounter: 172.7 cm (68\").    Weight as of this encounter: 84.3 kg (185 lb 12.8 oz).               Physical Exam     GENERAL APPEARANCE: Well developed, well nourished, alert and cooperative, and appears to be in no acute distress.    HEAD: normocephalic. Atraumatic.    EYES: PERRL, EOMI. Vision is grossly intact.    THROAT: Oral cavity and pharynx normal. No inflammation, swelling, exudate, or lesions.     NECK: Neck supple.  No thyromegaly.    CARDIAC: Normal S1 and S2. No S3, S4 or murmurs. Rhythm is regular.     RESPIRATORY:Bilateral air entry positive. No wheezing, crackles or rhonchi noted.    GI: Positive bowel sounds. Soft, nondistended, nontender.     MUSCULOSKELETAL: No significant deformity or joint abnormality. No edema. Peripheral pulses intact. No varicosities.    NEUROLOGICAL: Strength and sensation symmetric and intact throughout.     PSYCHIATRIC: The mental examination revealed the patient was oriented to person, place, and time. '  Result Review :    The following data was reviewed by: NATHALIE Ortega on 07/15/2024:  Common labs          5/14/2024    12:20   Common Labs "   WBC 5.42    Hemoglobin 13.6    Hematocrit 41.1    Platelets 207                   Assessment and Plan     Diagnoses and all orders for this visit:    1. Diaphragm dysfunction (Primary)    2. Moderate persistent asthma without complication    3. Overweight    4. Shortness of breath          PFT was reviewed and restriction is noted. DLCO is moderately reduced.    I suspect restriction is due to scarring and diaphragm weakness.    Lab work was also reviewed.    Continue Advair BID.  Continue albuterol as needed.      CT Chest was also reviewed. Notable for elevation of left hemidiaphragm which is similar to previous exams.  Linear scarring vs. Atelectasis left lower lobe.  Postsurgical changes noted.    We discussed diet and exercise.    Follow Up     Return in about 9 months (around 4/15/2025).  Patient was given instructions and counseling regarding her condition or for health maintenance advice. Please see specific information pulled into the AVS if appropriate.

## 2024-09-18 ENCOUNTER — TRANSCRIBE ORDERS (OUTPATIENT)
Dept: ADMINISTRATIVE | Facility: HOSPITAL | Age: 59
End: 2024-09-18
Payer: COMMERCIAL

## 2024-09-18 DIAGNOSIS — Z85.72 PERSONAL HISTORY OF MALIGNANT LYMPHOMA: ICD-10-CM

## 2024-09-18 DIAGNOSIS — R41.82 ALTERED MENTAL STATUS, UNSPECIFIED ALTERED MENTAL STATUS TYPE: ICD-10-CM

## 2024-09-18 DIAGNOSIS — M54.50 LOW BACK PAIN, UNSPECIFIED BACK PAIN LATERALITY, UNSPECIFIED CHRONICITY, UNSPECIFIED WHETHER SCIATICA PRESENT: Primary | ICD-10-CM

## (undated) DEVICE — PK SOL VISC ESOPH 80ML

## (undated) DEVICE — ENDOGATOR AUXILIARY WATER JET CONNECTOR: Brand: ENDOGATOR

## (undated) DEVICE — Device

## (undated) DEVICE — HOLDER: Brand: DEROYAL

## (undated) DEVICE — CONTAINER,SPECIMEN,OR STERILE,4OZ: Brand: MEDLINE

## (undated) DEVICE — 3M™ STERI-STRIP™ REINFORCED ADHESIVE SKIN CLOSURES, R1547, 1/2 IN X 4 IN (12 MM X 100 MM), 6 STRIPS/ENVELOPE: Brand: 3M™ STERI-STRIP™

## (undated) DEVICE — THE BITE BLOCK MAXI, LATEX FREE STRAP IS USED TO PROTECT THE ENDOSCOPE INSERTION TUBE FROM BEING BITTEN BY THE PATIENT.

## (undated) DEVICE — CONN Y IRR DISP 1P/U

## (undated) DEVICE — GLV SURG NEOLON 2G PF LF 8.5 STRL

## (undated) DEVICE — SPNG GZ WOVN 4X4IN 12PLY 10/BX STRL

## (undated) DEVICE — FRCP BX RADJAW4 NDL 2.8 240CM LG OG BX40

## (undated) DEVICE — CAPS TRANSMTR ENDOSC PH MONTR BRAVO

## (undated) DEVICE — PK HD AND NK 70

## (undated) DEVICE — Device: Brand: DEFENDO AIR/WATER/SUCTION AND BIOPSY VALVE

## (undated) DEVICE — DRSNG TELFA ILND ADH 4X6IN

## (undated) DEVICE — SIZE: FEMALE 10
Type: IMPLANTABLE DEVICE | Site: NECK | Status: NON-FUNCTIONAL
Brand: MONTGOMERY® THYROPLASTY IMPLANT